# Patient Record
Sex: FEMALE | Race: WHITE | NOT HISPANIC OR LATINO | Employment: UNEMPLOYED | RURAL
[De-identification: names, ages, dates, MRNs, and addresses within clinical notes are randomized per-mention and may not be internally consistent; named-entity substitution may affect disease eponyms.]

---

## 2017-09-21 ENCOUNTER — HISTORICAL (OUTPATIENT)
Dept: ADMINISTRATIVE | Facility: HOSPITAL | Age: 32
End: 2017-09-21

## 2017-09-22 LAB
LAB AP CLINICAL INFORMATION: NORMAL
LAB AP DIAGNOSIS - HISTORICAL: NORMAL
LAB AP GROSS PATHOLOGY - HISTORICAL: NORMAL
LAB AP SPECIMEN SUBMITTED - HISTORICAL: NORMAL

## 2019-09-03 ENCOUNTER — OFFICE VISIT (OUTPATIENT)
Dept: OBSTETRICS AND GYNECOLOGY | Facility: CLINIC | Age: 34
End: 2019-09-03
Payer: COMMERCIAL

## 2019-09-03 VITALS — WEIGHT: 183.38 LBS | SYSTOLIC BLOOD PRESSURE: 140 MMHG | DIASTOLIC BLOOD PRESSURE: 90 MMHG

## 2019-09-03 DIAGNOSIS — N93.9 ABNORMAL UTERINE BLEEDING (AUB): ICD-10-CM

## 2019-09-03 DIAGNOSIS — Z01.419 ENCOUNTER FOR ANNUAL ROUTINE GYNECOLOGICAL EXAMINATION: Primary | ICD-10-CM

## 2019-09-03 DIAGNOSIS — Z12.4 PAP SMEAR FOR CERVICAL CANCER SCREENING: ICD-10-CM

## 2019-09-03 PROCEDURE — 99999 PR PBB SHADOW E&M-NEW PATIENT-LVL II: CPT | Mod: PBBFAC,,, | Performed by: OBSTETRICS & GYNECOLOGY

## 2019-09-03 PROCEDURE — 99203 OFFICE O/P NEW LOW 30 MIN: CPT | Mod: S$GLB,,, | Performed by: OBSTETRICS & GYNECOLOGY

## 2019-09-03 PROCEDURE — 99999 PR PBB SHADOW E&M-NEW PATIENT-LVL II: ICD-10-PCS | Mod: PBBFAC,,, | Performed by: OBSTETRICS & GYNECOLOGY

## 2019-09-03 PROCEDURE — 99203 PR OFFICE/OUTPT VISIT, NEW, LEVL III, 30-44 MIN: ICD-10-PCS | Mod: S$GLB,,, | Performed by: OBSTETRICS & GYNECOLOGY

## 2019-09-03 RX ORDER — LEVOTHYROXINE SODIUM 75 UG/1
TABLET ORAL
COMMUNITY
End: 2019-09-03 | Stop reason: SDUPTHER

## 2019-09-03 RX ORDER — HYDROCHLOROTHIAZIDE 12.5 MG/1
12.5 CAPSULE ORAL DAILY
Qty: 30 CAPSULE | Refills: 3 | Status: ON HOLD | OUTPATIENT
Start: 2019-09-03 | End: 2019-10-30 | Stop reason: HOSPADM

## 2019-09-03 RX ORDER — MEDROXYPROGESTERONE ACETATE 10 MG/1
10 TABLET ORAL 2 TIMES DAILY
Qty: 20 TABLET | Refills: 0 | Status: ON HOLD | OUTPATIENT
Start: 2019-09-03 | End: 2019-10-30 | Stop reason: HOSPADM

## 2019-09-03 RX ORDER — FLUOXETINE HYDROCHLORIDE 40 MG/1
40 CAPSULE ORAL DAILY
COMMUNITY
End: 2019-11-01

## 2019-09-03 RX ORDER — LEVOTHYROXINE SODIUM 75 UG/1
75 TABLET ORAL
Qty: 30 TABLET | Refills: 0 | Status: SHIPPED | OUTPATIENT
Start: 2019-09-03 | End: 2019-11-01

## 2019-09-03 NOTE — PROGRESS NOTES
Chief Complaint   Patient presents with    Metrorrhagia     started bleeding 8/19/19-present//lower abdominal pain       HISTORY OF PRESENT ILLNESS:   Janell Keating is a 34 y.o. female  who presents for AUB.  Patient's last menstrual period was 08/19/2019..  She normally monthly cycles lasting 10 days. But she started bleeding 8/19 and hasn't stopped. She is changing 7-8 tampons a day. She is passing quarter sized clots. She is starting to feel fatigued. She absolutely declines pelvic exam today. Hasn't had one since 14.       Past Medical History:   Diagnosis Date    Depression           Past Surgical History:   Procedure Laterality Date    GALLBLADDER SURGERY           Social History     Socioeconomic History    Marital status:      Spouse name: Not on file    Number of children: Not on file    Years of education: Not on file    Highest education level: Not on file   Occupational History    Not on file   Social Needs    Financial resource strain: Not on file    Food insecurity:     Worry: Not on file     Inability: Not on file    Transportation needs:     Medical: Not on file     Non-medical: Not on file   Tobacco Use    Smoking status: Heavy Tobacco Smoker   Substance and Sexual Activity    Alcohol use: Yes     Alcohol/week: 1.2 oz     Types: 2 Cans of beer per week    Drug use: Yes     Types: Marijuana    Sexual activity: Yes     Partners: Female   Lifestyle    Physical activity:     Days per week: Not on file     Minutes per session: Not on file    Stress: Not on file   Relationships    Social connections:     Talks on phone: Not on file     Gets together: Not on file     Attends Worship service: Not on file     Active member of club or organization: Not on file     Attends meetings of clubs or organizations: Not on file     Relationship status: Not on file   Other Topics Concern    Not on file   Social History Narrative    Not on file       History reviewed. No pertinent family  history.      OB History    Para Term  AB Living   0 0 0 0 0 0   SAB TAB Ectopic Multiple Live Births   0 0 0 0 0         COMPREHENSIVE GYN HISTORY:  PAP History: Denies abnormal Paps  Infection History: Denies STDs. Denies PID.  Benign History:Denies uterine fibroids. Denies ovarian cysts. Denies endometriosis Denies other conditions.  Cancer History: Denies cervical cancer. Denies uterine cancer or hyperplasia. Denies ovarian cancer. Denies vulvar cancer or pre-cancer. Denies vaginal cancer or pre-cancer. Denies breast cancer. Denies colon cancer.  Cycle: 12/mon/7-10 days    ROS:  GENERAL: Denies weight gain or weight loss. Feeling well overall.   SKIN: Denies rash or lesions.   HEAD: Denies headache.   NODES: Denies enlarged lymph nodes.   CHEST: Denies shortness of breath.   ABDOMEN: No abdominal pain, constipation, diarrhea, nausea, vomiting or rectal bleeding.   URINARY: No frequency, dysuria, hematuria, or burning on urination.  REPRODUCTIVE: See HPI.   BREASTS: The patient denies pain, lumps, or nipple discharge.       BP (!) 140/90   Wt 83.2 kg (183 lb 6.4 oz)   LMP 2019     APPEARANCE: Well nourished, well developed, in no acute distress.  NECK: Neck symmetric without  thyromegaly.  NODES: No inguinal, cervical lymph node enlargement.  CHEST: Lungs clear to auscultation.  HEART: Regular rate and rhythm, no murmurs, rubs or gallops.  ABDOMEN: Soft. No tenderness or masses. No hernias. No hepatosplenomegaly.    PELVIC: declined.      1. Encounter for annual routine gynecological examination    2. Pap smear for cervical cancer screening    3. Abnormal uterine bleeding (AUB)        Plan:  Discussed with her that we do exams all the time on people who are bleeding and it would help me figure out why she is bleeding but she declines until the bleeding stops. Will treat with provera 10mg BID x7 days and see back next week. We agreed if the bleeding hasn't stopped by then she will agree to an  exam. Will get TVUS and CBC and TSH in the meantime.   She is new to town and needs refill of her thyroid and BP medications. Sent in a 1 month supply and sent her upstairs to make PCP appt.       F/u in 1 week

## 2019-09-04 ENCOUNTER — HOSPITAL ENCOUNTER (OUTPATIENT)
Dept: RADIOLOGY | Facility: HOSPITAL | Age: 34
Discharge: HOME | End: 2019-09-04
Attending: OBSTETRICS & GYNECOLOGY
Payer: COMMERCIAL

## 2019-09-04 DIAGNOSIS — N93.9 ABNORMAL UTERINE BLEEDING (AUB): ICD-10-CM

## 2019-09-04 PROCEDURE — 76856 US EXAM PELVIC COMPLETE: CPT | Mod: TC,PO

## 2019-09-10 ENCOUNTER — OFFICE VISIT (OUTPATIENT)
Dept: OBSTETRICS AND GYNECOLOGY | Facility: CLINIC | Age: 34
End: 2019-09-10
Payer: COMMERCIAL

## 2019-09-10 VITALS — SYSTOLIC BLOOD PRESSURE: 150 MMHG | DIASTOLIC BLOOD PRESSURE: 96 MMHG | WEIGHT: 179.38 LBS

## 2019-09-10 DIAGNOSIS — Z12.4 PAP SMEAR FOR CERVICAL CANCER SCREENING: ICD-10-CM

## 2019-09-10 DIAGNOSIS — N93.9 ABNORMAL UTERINE BLEEDING (AUB): ICD-10-CM

## 2019-09-10 DIAGNOSIS — N83.201 CYST OF RIGHT OVARY: ICD-10-CM

## 2019-09-10 DIAGNOSIS — Z01.419 ENCOUNTER FOR ANNUAL ROUTINE GYNECOLOGICAL EXAMINATION: Primary | ICD-10-CM

## 2019-09-10 PROCEDURE — 99999 PR PBB SHADOW E&M-EST. PATIENT-LVL III: ICD-10-PCS | Mod: PBBFAC,,, | Performed by: OBSTETRICS & GYNECOLOGY

## 2019-09-10 PROCEDURE — 99395 PREV VISIT EST AGE 18-39: CPT | Mod: S$GLB,,, | Performed by: OBSTETRICS & GYNECOLOGY

## 2019-09-10 PROCEDURE — 99395 PR PREVENTIVE VISIT,EST,18-39: ICD-10-PCS | Mod: S$GLB,,, | Performed by: OBSTETRICS & GYNECOLOGY

## 2019-09-10 PROCEDURE — 88175 CYTOPATH C/V AUTO FLUID REDO: CPT

## 2019-09-10 PROCEDURE — 99999 PR PBB SHADOW E&M-EST. PATIENT-LVL III: CPT | Mod: PBBFAC,,, | Performed by: OBSTETRICS & GYNECOLOGY

## 2019-09-10 PROCEDURE — 87624 HPV HI-RISK TYP POOLED RSLT: CPT

## 2019-09-16 LAB
HPV HR 12 DNA CVX QL NAA+PROBE: NEGATIVE
HPV16 AG SPEC QL: NEGATIVE
HPV18 DNA SPEC QL NAA+PROBE: NEGATIVE

## 2019-10-29 ENCOUNTER — HOSPITAL ENCOUNTER (OUTPATIENT)
Facility: HOSPITAL | Age: 34
Discharge: HOME | End: 2019-10-30
Attending: EMERGENCY MEDICINE | Admitting: INTERNAL MEDICINE
Payer: COMMERCIAL

## 2019-10-29 DIAGNOSIS — E03.9 HYPOTHYROIDISM, UNSPECIFIED TYPE: ICD-10-CM

## 2019-10-29 DIAGNOSIS — F12.10 MILD TETRAHYDROCANNABINOL (THC) ABUSE: ICD-10-CM

## 2019-10-29 DIAGNOSIS — I10 ESSENTIAL HYPERTENSION: ICD-10-CM

## 2019-10-29 DIAGNOSIS — R10.10 UPPER ABDOMINAL PAIN: ICD-10-CM

## 2019-10-29 DIAGNOSIS — R11.10 EMESIS: ICD-10-CM

## 2019-10-29 DIAGNOSIS — F32.A DEPRESSION, UNSPECIFIED DEPRESSION TYPE: ICD-10-CM

## 2019-10-29 DIAGNOSIS — R11.2 INTRACTABLE VOMITING WITH NAUSEA: Primary | ICD-10-CM

## 2019-10-29 DIAGNOSIS — F10.10 ALCOHOL ABUSE: ICD-10-CM

## 2019-10-29 DIAGNOSIS — E03.9 ACQUIRED HYPOTHYROIDISM: ICD-10-CM

## 2019-10-29 LAB
ALBUMIN SERPL BCP-MCNC: 4.4 G/DL (ref 3.5–5.2)
ALP SERPL-CCNC: 104 U/L (ref 38–126)
ALT SERPL W/O P-5'-P-CCNC: 20 U/L (ref 10–44)
AMPHET+METHAMPHET UR QL: NEGATIVE
ANION GAP SERPL CALC-SCNC: 12 MMOL/L (ref 8–16)
AST SERPL-CCNC: 28 U/L (ref 15–46)
B-HCG UR QL: NEGATIVE
BARBITURATES UR QL SCN>200 NG/ML: NEGATIVE
BASOPHILS # BLD AUTO: 0.05 K/UL (ref 0–0.2)
BASOPHILS NFR BLD: 0.3 % (ref 0–1.9)
BENZODIAZ UR QL SCN>200 NG/ML: NEGATIVE
BILIRUB SERPL-MCNC: 0.5 MG/DL (ref 0.1–1)
BILIRUB UR QL STRIP: NEGATIVE
BUN SERPL-MCNC: 11 MG/DL (ref 7–17)
BZE UR QL SCN: NEGATIVE
CALCIUM SERPL-MCNC: 9.1 MG/DL (ref 8.7–10.5)
CANNABINOIDS UR QL SCN: NORMAL
CHLORIDE SERPL-SCNC: 109 MMOL/L (ref 95–110)
CLARITY UR REFRACT.AUTO: ABNORMAL
CO2 SERPL-SCNC: 21 MMOL/L (ref 23–29)
COLOR UR AUTO: YELLOW
CREAT SERPL-MCNC: 0.54 MG/DL (ref 0.5–1.4)
CREAT UR-MCNC: 58.2 MG/DL (ref 15–325)
DIFFERENTIAL METHOD: ABNORMAL
EOSINOPHIL # BLD AUTO: 0 K/UL (ref 0–0.5)
EOSINOPHIL NFR BLD: 0.1 % (ref 0–8)
ERYTHROCYTE [DISTWIDTH] IN BLOOD BY AUTOMATED COUNT: 13.7 % (ref 11.5–14.5)
EST. GFR  (AFRICAN AMERICAN): >60 ML/MIN/1.73 M^2
EST. GFR  (NON AFRICAN AMERICAN): >60 ML/MIN/1.73 M^2
GLUCOSE SERPL-MCNC: 125 MG/DL (ref 70–110)
GLUCOSE UR QL STRIP: NEGATIVE
HCT VFR BLD AUTO: 41.8 % (ref 37–48.5)
HGB BLD-MCNC: 14 G/DL (ref 12–16)
HGB UR QL STRIP: ABNORMAL
KETONES UR QL STRIP: ABNORMAL
LEUKOCYTE ESTERASE UR QL STRIP: NEGATIVE
LIPASE SERPL-CCNC: 56 U/L (ref 23–300)
LYMPHOCYTES # BLD AUTO: 2 K/UL (ref 1–4.8)
LYMPHOCYTES NFR BLD: 10.5 % (ref 18–48)
MCH RBC QN AUTO: 30.6 PG (ref 27–31)
MCHC RBC AUTO-ENTMCNC: 33.5 G/DL (ref 32–36)
MCV RBC AUTO: 92 FL (ref 82–98)
METHADONE UR QL SCN>300 NG/ML: NEGATIVE
MICROSCOPIC COMMENT: ABNORMAL
MONOCYTES # BLD AUTO: 1.2 K/UL (ref 0.3–1)
MONOCYTES NFR BLD: 6.3 % (ref 4–15)
NEUTROPHILS # BLD AUTO: 16 K/UL (ref 1.8–7.7)
NEUTROPHILS NFR BLD: 82.8 % (ref 38–73)
NITRITE UR QL STRIP: NEGATIVE
OPIATES UR QL SCN: NEGATIVE
PCP UR QL SCN>25 NG/ML: NEGATIVE
PH UR STRIP: 8 [PH] (ref 5–8)
PLATELET # BLD AUTO: 423 K/UL (ref 150–350)
PMV BLD AUTO: 10 FL (ref 9.2–12.9)
POTASSIUM SERPL-SCNC: 3.5 MMOL/L (ref 3.5–5.1)
PROT SERPL-MCNC: 7.2 G/DL (ref 6–8.4)
PROT UR QL STRIP: ABNORMAL
RBC # BLD AUTO: 4.57 M/UL (ref 4–5.4)
RBC #/AREA URNS AUTO: 25 /HPF (ref 0–4)
SODIUM SERPL-SCNC: 142 MMOL/L (ref 136–145)
SP GR UR STRIP: 1.01 (ref 1–1.03)
TOXICOLOGY INFORMATION: NORMAL
URN SPEC COLLECT METH UR: ABNORMAL
UROBILINOGEN UR STRIP-ACNC: NEGATIVE EU/DL
WBC # BLD AUTO: 19.41 K/UL (ref 3.9–12.7)

## 2019-10-29 PROCEDURE — G0378 HOSPITAL OBSERVATION PER HR: HCPCS

## 2019-10-29 PROCEDURE — 93010 ELECTROCARDIOGRAM REPORT: CPT | Mod: ,,, | Performed by: INTERNAL MEDICINE

## 2019-10-29 PROCEDURE — 96361 HYDRATE IV INFUSION ADD-ON: CPT

## 2019-10-29 PROCEDURE — 85025 COMPLETE CBC W/AUTO DIFF WBC: CPT | Mod: ER

## 2019-10-29 PROCEDURE — 80053 COMPREHEN METABOLIC PANEL: CPT | Mod: ER

## 2019-10-29 PROCEDURE — 63600175 PHARM REV CODE 636 W HCPCS: Performed by: INTERNAL MEDICINE

## 2019-10-29 PROCEDURE — 96361 HYDRATE IV INFUSION ADD-ON: CPT | Mod: ER

## 2019-10-29 PROCEDURE — 25000003 PHARM REV CODE 250: Mod: ER | Performed by: EMERGENCY MEDICINE

## 2019-10-29 PROCEDURE — 80307 DRUG TEST PRSMV CHEM ANLYZR: CPT | Mod: ER

## 2019-10-29 PROCEDURE — 83690 ASSAY OF LIPASE: CPT | Mod: ER

## 2019-10-29 PROCEDURE — 96376 TX/PRO/DX INJ SAME DRUG ADON: CPT

## 2019-10-29 PROCEDURE — 81025 URINE PREGNANCY TEST: CPT | Mod: ER

## 2019-10-29 PROCEDURE — 63600175 PHARM REV CODE 636 W HCPCS: Mod: ER | Performed by: EMERGENCY MEDICINE

## 2019-10-29 PROCEDURE — 99285 EMERGENCY DEPT VISIT HI MDM: CPT | Mod: 25,ER

## 2019-10-29 PROCEDURE — 81000 URINALYSIS NONAUTO W/SCOPE: CPT | Mod: 59,ER

## 2019-10-29 PROCEDURE — 96365 THER/PROPH/DIAG IV INF INIT: CPT | Mod: ER

## 2019-10-29 PROCEDURE — 93010 EKG 12-LEAD: ICD-10-PCS | Mod: ,,, | Performed by: INTERNAL MEDICINE

## 2019-10-29 PROCEDURE — 93005 ELECTROCARDIOGRAM TRACING: CPT | Mod: ER

## 2019-10-29 PROCEDURE — 63600175 PHARM REV CODE 636 W HCPCS: Performed by: STUDENT IN AN ORGANIZED HEALTH CARE EDUCATION/TRAINING PROGRAM

## 2019-10-29 PROCEDURE — S0028 INJECTION, FAMOTIDINE, 20 MG: HCPCS | Mod: ER | Performed by: EMERGENCY MEDICINE

## 2019-10-29 PROCEDURE — 96375 TX/PRO/DX INJ NEW DRUG ADDON: CPT | Mod: ER

## 2019-10-29 PROCEDURE — 25500020 PHARM REV CODE 255: Mod: ER | Performed by: EMERGENCY MEDICINE

## 2019-10-29 PROCEDURE — 25000003 PHARM REV CODE 250: Performed by: STUDENT IN AN ORGANIZED HEALTH CARE EDUCATION/TRAINING PROGRAM

## 2019-10-29 RX ORDER — HYDROCHLOROTHIAZIDE 12.5 MG/1
12.5 TABLET ORAL DAILY
Status: DISCONTINUED | OUTPATIENT
Start: 2019-10-30 | End: 2019-10-30 | Stop reason: HOSPADM

## 2019-10-29 RX ORDER — HYOSCYAMINE SULFATE 0.38 MG/1
0.38 TABLET, EXTENDED RELEASE ORAL
Status: DISCONTINUED | OUTPATIENT
Start: 2019-10-29 | End: 2019-10-29

## 2019-10-29 RX ORDER — FLUOXETINE HYDROCHLORIDE 20 MG/1
40 CAPSULE ORAL DAILY
Status: DISCONTINUED | OUTPATIENT
Start: 2019-10-30 | End: 2019-10-30 | Stop reason: HOSPADM

## 2019-10-29 RX ORDER — ONDANSETRON 2 MG/ML
8 INJECTION INTRAMUSCULAR; INTRAVENOUS EVERY 6 HOURS
Status: DISCONTINUED | OUTPATIENT
Start: 2019-10-30 | End: 2019-10-30 | Stop reason: HOSPADM

## 2019-10-29 RX ORDER — ONDANSETRON 2 MG/ML
4 INJECTION INTRAMUSCULAR; INTRAVENOUS
Status: COMPLETED | OUTPATIENT
Start: 2019-10-29 | End: 2019-10-29

## 2019-10-29 RX ORDER — HALOPERIDOL 5 MG/ML
5 INJECTION INTRAMUSCULAR
Status: COMPLETED | OUTPATIENT
Start: 2019-10-29 | End: 2019-10-29

## 2019-10-29 RX ORDER — KETOROLAC TROMETHAMINE 30 MG/ML
30 INJECTION, SOLUTION INTRAMUSCULAR; INTRAVENOUS
Status: COMPLETED | OUTPATIENT
Start: 2019-10-29 | End: 2019-10-29

## 2019-10-29 RX ORDER — ONDANSETRON HCL IN 0.9 % NACL 8 MG/50 ML
8 INTRAVENOUS SOLUTION, PIGGYBACK (ML) INTRAVENOUS EVERY 6 HOURS PRN
Status: DISCONTINUED | OUTPATIENT
Start: 2019-10-29 | End: 2019-10-29

## 2019-10-29 RX ORDER — FAMOTIDINE 10 MG/ML
20 INJECTION INTRAVENOUS
Status: COMPLETED | OUTPATIENT
Start: 2019-10-29 | End: 2019-10-29

## 2019-10-29 RX ORDER — LEVOTHYROXINE SODIUM 25 UG/1
75 TABLET ORAL
Status: DISCONTINUED | OUTPATIENT
Start: 2019-10-30 | End: 2019-10-30 | Stop reason: HOSPADM

## 2019-10-29 RX ORDER — METOCLOPRAMIDE HYDROCHLORIDE 5 MG/ML
10 INJECTION INTRAMUSCULAR; INTRAVENOUS
Status: COMPLETED | OUTPATIENT
Start: 2019-10-29 | End: 2019-10-29

## 2019-10-29 RX ORDER — ONDANSETRON 2 MG/ML
8 INJECTION INTRAMUSCULAR; INTRAVENOUS EVERY 6 HOURS PRN
Status: DISCONTINUED | OUTPATIENT
Start: 2019-10-29 | End: 2019-10-29

## 2019-10-29 RX ADMIN — ONDANSETRON 8 MG: 2 INJECTION INTRAMUSCULAR; INTRAVENOUS at 08:10

## 2019-10-29 RX ADMIN — PROMETHAZINE HYDROCHLORIDE 25 MG: 25 INJECTION INTRAMUSCULAR; INTRAVENOUS at 03:10

## 2019-10-29 RX ADMIN — PROMETHAZINE HYDROCHLORIDE 25 MG: 25 INJECTION INTRAMUSCULAR; INTRAVENOUS at 10:10

## 2019-10-29 RX ADMIN — ONDANSETRON 4 MG: 2 INJECTION INTRAMUSCULAR; INTRAVENOUS at 08:10

## 2019-10-29 RX ADMIN — HALOPERIDOL LACTATE 5 MG: 5 INJECTION, SOLUTION INTRAMUSCULAR at 12:10

## 2019-10-29 RX ADMIN — FOLIC ACID: 5 INJECTION, SOLUTION INTRAMUSCULAR; INTRAVENOUS; SUBCUTANEOUS at 08:10

## 2019-10-29 RX ADMIN — IOHEXOL 90 ML: 350 INJECTION, SOLUTION INTRAVENOUS at 01:10

## 2019-10-29 RX ADMIN — SODIUM CHLORIDE 1000 ML: 0.9 INJECTION, SOLUTION INTRAVENOUS at 03:10

## 2019-10-29 RX ADMIN — SODIUM CHLORIDE, SODIUM LACTATE, POTASSIUM CHLORIDE, AND CALCIUM CHLORIDE 1000 ML: .6; .31; .03; .02 INJECTION, SOLUTION INTRAVENOUS at 07:10

## 2019-10-29 RX ADMIN — FAMOTIDINE 20 MG: 10 INJECTION, SOLUTION INTRAVENOUS at 08:10

## 2019-10-29 RX ADMIN — LORAZEPAM 1 MG: 2 INJECTION INTRAMUSCULAR; INTRAVENOUS at 10:10

## 2019-10-29 RX ADMIN — SODIUM CHLORIDE, SODIUM LACTATE, POTASSIUM CHLORIDE, AND CALCIUM CHLORIDE 1000 ML: .6; .31; .03; .02 INJECTION, SOLUTION INTRAVENOUS at 08:10

## 2019-10-29 RX ADMIN — METOCLOPRAMIDE 10 MG: 5 INJECTION, SOLUTION INTRAMUSCULAR; INTRAVENOUS at 09:10

## 2019-10-29 RX ADMIN — KETOROLAC TROMETHAMINE 30 MG: 30 INJECTION, SOLUTION INTRAMUSCULAR at 08:10

## 2019-10-29 RX ADMIN — PROMETHAZINE HYDROCHLORIDE 25 MG: 25 INJECTION INTRAMUSCULAR; INTRAVENOUS at 07:10

## 2019-10-29 NOTE — ED PROVIDER NOTES
Encounter Date: 10/29/2019       History     Chief Complaint   Patient presents with    Emesis     Pt c/o N/V/D with intermittent ABD cramping this AM.  Pt is actively vomiting during triage.     HPI   This is a 34 y.o. female who has a past medical history of Depression and Hypothyroidism.     The patient presents to the Emergency Department with nausea vomiting and diarrhea since this morning.  Patient states that she drink too much alcohol yesterday.   Symptoms are associated with upper abdominal cramping.  Pt denies fever or chills, URI symptoms, blood in urine, stool or vomit.  Symptoms are aggravated by p.o. intake.  Symptoms are relieved by nothing.   Patient has prior history of similar symptoms.       Review of patient's allergies indicates:   Allergen Reactions    Sulfa (sulfonamide antibiotics) Hives     Past Medical History:   Diagnosis Date    Depression     Hypothyroidism      Past Surgical History:   Procedure Laterality Date    GALLBLADDER SURGERY       History reviewed. No pertinent family history.  Social History     Tobacco Use    Smoking status: Heavy Tobacco Smoker   Substance Use Topics    Alcohol use: Yes     Alcohol/week: 2.0 standard drinks     Types: 2 Cans of beer per week    Drug use: Yes     Types: Marijuana     Review of Systems   Constitutional: Positive for activity change, appetite change and fatigue. Negative for fever.   HENT: Negative for sore throat.    Respiratory: Negative for shortness of breath.    Cardiovascular: Negative for chest pain.   Gastrointestinal: Positive for abdominal pain, diarrhea, nausea and vomiting. Negative for blood in stool.   Genitourinary: Negative for dysuria.   Musculoskeletal: Negative for back pain.   Skin: Negative for rash.   Neurological: Positive for light-headedness. Negative for weakness and headaches.   Hematological: Does not bruise/bleed easily.   Psychiatric/Behavioral: Positive for sleep disturbance.   All other systems reviewed  and are negative.      Physical Exam     Initial Vitals [10/29/19 0650]   BP Pulse Resp Temp SpO2   (!) 179/79 79 (!) 22 96.6 °F (35.9 °C) 97 %      MAP       --         Physical Exam    Nursing note and vitals reviewed.  Constitutional: She appears well-developed and well-nourished. She is not diaphoretic. She appears distressed.   Curled up in stretcher, fetal position, looks uncomfortable   HENT:   Head: Normocephalic and atraumatic.   Dry oropharynx, foul breath   Eyes: Conjunctivae are normal. Pupils are equal, round, and reactive to light.   Neck: Neck supple.   Cardiovascular: Normal rate, regular rhythm, normal heart sounds and intact distal pulses.   Pulmonary/Chest: Breath sounds normal. No respiratory distress. She has no wheezes. She has no rhonchi. She has no rales.   Abdominal: Soft. She exhibits no distension. There is tenderness (Epigastric). There is no guarding.   Mildly hyperactive bowel sounds   Musculoskeletal: Normal range of motion. She exhibits no edema or tenderness.   Neurological: She is oriented to person, place, and time. She has normal strength.   Awake, appears fatigued   Skin: Skin is warm and dry. Capillary refill takes less than 2 seconds. No rash noted.   Psychiatric: She has a normal mood and affect. Thought content normal.         ED Course   Procedures  Labs Reviewed   CBC W/ AUTO DIFFERENTIAL - Abnormal; Notable for the following components:       Result Value    WBC 19.41 (*)     Platelets 423 (*)     Gran # (ANC) 16.0 (*)     Mono # 1.2 (*)     Gran% 82.8 (*)     Lymph% 10.5 (*)     All other components within normal limits   COMPREHENSIVE METABOLIC PANEL - Abnormal; Notable for the following components:    CO2 21 (*)     Glucose 125 (*)     All other components within normal limits   LIPASE   DRUG SCREEN PANEL, URINE EMERGENCY   PREGNANCY TEST, URINE RAPID   URINALYSIS, REFLEX TO URINE CULTURE        ECG Results          EKG 12-lead (Final result)  Result time 10/29/19  14:50:21    Final result by Myke, Lab [facility] (10/29/19 14:50:21)                 Narrative:    Test Reason : R11.10,    Vent. Rate : 061 BPM     Atrial Rate : 061 BPM     P-R Int : 130 ms          QRS Dur : 090 ms      QT Int : 428 ms       P-R-T Axes : 009 007 017 degrees     QTc Int : 430 ms    Normal sinus rhythm with sinus arrhythmia  Possible Inferior infarct ,age undetermined  Abnormal ECG  No previous ECGs available  Confirmed by Thierry HUANG MD, Con GANT ([id number]) on 10/29/2019 2:50:07 PM    Referred By: [email or web address]   SELF           Confirmed By:Con Guadarrama III, MD                            Imaging Results          CT Abdomen Pelvis With Contrast (Final result)  Result time 10/29/19 13:54:27    Final result by SINDY Gil Sr., MD (10/29/19 13:54:27)                 Impression:      1. The appendix and the rest of the gastrointestinal system are normal in appearance.  2. The uterus and ovaries were not optimally visualized. There are findings characteristic of a 16 mm follicle in the left ovary.  If additional imaging evaluation is clinically indicated, I recommend consideration of an ultrasound examination of the pelvis.  3. There are 4 lumbar-type vertebral bodies. There is a transitional vertebral body between L4 and the sacrum.  4. There are mild dependent atelectatic changes in both lungs.  5. The gallbladder is absent. There are surgical clips in the gallbladder fossa.  All CT scans at this facility use dose modulation, iterative reconstruction, and/or weight base dosing when appropriate to reduce radiation dose when appropriate to reduce radiation dose to as low as reasonably achievable.      Electronically signed by: Isma Gil MD  Date:    10/29/2019  Time:    13:54             Narrative:    EXAMINATION:  CT ABDOMEN PELVIS WITH CONTRAST    CLINICAL HISTORY:  Bowel obstruction, high-grade;    TECHNIQUE:  Standard abdomen and pelvis CT protocol with IV contrast was performed.  90 mL  of Omnipaque 350 contrast material was used for this examination.  There was no oral contrast administered.    COMPARISON:  A KUB performed on 10/29/2019.    FINDINGS:  Finding: The size of the heart is within normal limits.  There are mild dependent atelectatic changes in both lungs.  There is no pneumothorax or pleural effusion.    The gallbladder is absent.  There are surgical clips in the gallbladder fossa.  The liver, pancreas, spleen, adrenals, and kidneys are normal in appearance. The ureters and the urinary bladder are normal in appearance.  The uterus and ovaries were not optimally visualized.  There are findings characteristic of a 16 mm follicle in the left ovary.  The appendix and the rest of the gastrointestinal system are normal in appearance. There is no free fluid within the abdomen or pelvis. There is no pneumoperitoneum.  There are 4 lumbar-type vertebral bodies.  There is a transitional vertebral body between L4 and the sacrum.                               X-Ray Abdomen AP 1 View (KUB) (Final result)  Result time 10/29/19 08:40:58    Final result by SINDY Gil Sr., MD (10/29/19 08:40:58)                 Impression:      1. There are surgical clips projected over the right upper quadrant of the abdomen.  2. There are 4 lumbar-type vertebral bodies. There is a transitional vertebral body between L4 and the sacrum.      Electronically signed by: Isma Gil MD  Date:    10/29/2019  Time:    08:40             Narrative:    EXAMINATION:  XR ABDOMEN AP 1 VIEW    CLINICAL HISTORY:  Upper abdominal pain, unspecified    COMPARISON:  None    FINDINGS:  The bowel gas pattern is normal in appearance. There is no pneumoperitoneum.  There are 4 lumbar-type vertebral bodies.  There is a transitional vertebral body between L4 and the sacrum.  There are surgical clips projected over the right upper quadrant of the abdomen.                              X-Rays:   Independently Interpreted Readings:    Other Readings:  X-ray abdomen:  Paucity of bowel gas, nonobstructive bowel gas pattern.    Medical Decision Making:   Initial Assessment:   This is an emergent evaluation of a 34 y.o. female patient with presentation of nausea, vomiting, diarrhea.  Patient states she believes it is from drinking too much yesterday.     Dx:  Alcoholic gastritis, appendicitis, viral gastroenteritis, food poisoning, doubt colitis or appendicitis    Plan: IV fluids, antiemetic, pain control, basic labs, lipase    Clinical Tests:   Lab Tests: Ordered and Reviewed  Radiological Study: Ordered and Reviewed                   ED Course as of Oct 29 1457   Tue Oct 29, 2019   0733 Lipase Result: 56 [NP]   0733 Sodium: 142 [NP]   0733 Potassium: 3.5 [NP]   0733 Chloride: 109 [NP]   0733 CO2(!): 21 [NP]   0733 Glucose(!): 125 [NP]   0733 BUN, Bld: 11 [NP]   0733 Creatinine: 0.54 [NP]   0733 WBC(!): 19.41 [NP]   0733 Hemoglobin: 14.0 [NP]   0733 Platelets(!): 423 [NP]   0807 Patient re-evaluated, stillAppears uncomfortable.  Now complaining of pain in the epigastric region.    [NP]   1231 EKG:  Sinus rhythm with sinus arrhythmia at 60 bpm, nl axis, nl intervals, no hypertrophy, no ST-T changes as read by me (Dr. Perez).  Impression:  Otherwise normal EKG.  No QTC prolongation    [NP]   1231 Proceed with Haldol IV    [NP]   1456 Patient has not significantly improved and is still vomiting after Haldol, amongst all the other therapies today.  She has intractable vomiting, possibly gastritis from alcohol.  CT scan does not show any significant findings at this time.       Case discussed with Dr. Almanza, [facility], who accepted patient to CDU.  Will add on urine studies.  Patient wishes to go by POV.  Will remove IV prior to transport to [place].    [NP]      ED Course User Index  [NP] Manish Perez MD     Clinical Impression:       ICD-10-CM ICD-9-CM   1. Intractable vomiting with nausea R11.2 536.2   2. Upper abdominal pain R10.10  789.09   3. Emesis R11.10 787.03                                Manish Perez MD  10/29/19 1457

## 2019-10-29 NOTE — ED NOTES
Patient actively vomiting. Patient requesting something to drink. Patient informed that she should wait until after she receive nausea medication since she is presently vomiting.

## 2019-10-29 NOTE — ED NOTES
Call for CDU bed from [facility] ED charge nurse Dariela, awaiting return call.  Informed that Dr Perez states pt will be transporting POV.

## 2019-10-29 NOTE — ED NOTES
Patient lying in bed, no signs of distress is noted. Patient informed that a CT scan was ordered for her. Patient family member is at the bedside. Will continue to monitor the patient.

## 2019-10-29 NOTE — ED NOTES
Patient came in with a c/o nausea and vomiting which started early this am. Patient states that she had a lot to drink last night. Patient denies any abdominal pain.

## 2019-10-29 NOTE — ED NOTES
Patient refused to be placed in hospital gown. Patient informed that she had vomit on her shirt. Patient states that she want to stay in her clothes.

## 2019-10-29 NOTE — ED NOTES
Patient informed that she will be admitted to the hospital. Patient agrees; however patient want to drive to the receiving hospital. MD states that's ok.

## 2019-10-29 NOTE — ED NOTES
EKG completed, reviewed by Dr. Perez, QT interval is 61. Per pharmacy ( [facility]) okay to give Haldol IV with monitoring of QT interval for prolongation. Pt was placed on cardiac monitoring and given medications.    Pt vomited prior to medication administration, which was green and foul in odor.

## 2019-10-29 NOTE — ED NOTES
Patient changed to position of comfort. Patient provided warm blankets. Will continue to monitor the patient.

## 2019-10-30 ENCOUNTER — CLINICAL SUPPORT (OUTPATIENT)
Dept: SMOKING CESSATION | Facility: CLINIC | Age: 34
End: 2019-10-30

## 2019-10-30 VITALS
HEIGHT: 65 IN | DIASTOLIC BLOOD PRESSURE: 72 MMHG | BODY MASS INDEX: 30.05 KG/M2 | RESPIRATION RATE: 18 BRPM | WEIGHT: 180.38 LBS | HEART RATE: 56 BPM | TEMPERATURE: 99 F | SYSTOLIC BLOOD PRESSURE: 161 MMHG | OXYGEN SATURATION: 99 %

## 2019-10-30 DIAGNOSIS — F17.210 CIGARETTE SMOKER: Primary | ICD-10-CM

## 2019-10-30 LAB
ALBUMIN SERPL BCP-MCNC: 3.2 G/DL (ref 3.5–5.2)
ALP SERPL-CCNC: 66 U/L (ref 55–135)
ALT SERPL W/O P-5'-P-CCNC: 12 U/L (ref 10–44)
ANION GAP SERPL CALC-SCNC: 10 MMOL/L (ref 8–16)
AST SERPL-CCNC: 19 U/L (ref 10–40)
BASOPHILS # BLD AUTO: 0.02 K/UL (ref 0–0.2)
BASOPHILS NFR BLD: 0.2 % (ref 0–1.9)
BILIRUB SERPL-MCNC: 0.4 MG/DL (ref 0.1–1)
BUN SERPL-MCNC: 7 MG/DL (ref 6–20)
CALCIUM SERPL-MCNC: 8.3 MG/DL (ref 8.7–10.5)
CHLORIDE SERPL-SCNC: 105 MMOL/L (ref 95–110)
CO2 SERPL-SCNC: 25 MMOL/L (ref 23–29)
CREAT SERPL-MCNC: 0.7 MG/DL (ref 0.5–1.4)
DIFFERENTIAL METHOD: ABNORMAL
EOSINOPHIL # BLD AUTO: 0 K/UL (ref 0–0.5)
EOSINOPHIL NFR BLD: 0.2 % (ref 0–8)
ERYTHROCYTE [DISTWIDTH] IN BLOOD BY AUTOMATED COUNT: 13.6 % (ref 11.5–14.5)
EST. GFR  (AFRICAN AMERICAN): >60 ML/MIN/1.73 M^2
EST. GFR  (NON AFRICAN AMERICAN): >60 ML/MIN/1.73 M^2
FERRITIN SERPL-MCNC: 23 NG/ML (ref 20–300)
FOLATE SERPL-MCNC: 32.9 NG/ML (ref 4–24)
GLUCOSE SERPL-MCNC: 130 MG/DL (ref 70–110)
HCT VFR BLD AUTO: 35 % (ref 37–48.5)
HGB BLD-MCNC: 11.3 G/DL (ref 12–16)
IRON SERPL-MCNC: 82 UG/DL (ref 30–160)
LYMPHOCYTES # BLD AUTO: 2.2 K/UL (ref 1–4.8)
LYMPHOCYTES NFR BLD: 17.9 % (ref 18–48)
MCH RBC QN AUTO: 29.8 PG (ref 27–31)
MCHC RBC AUTO-ENTMCNC: 32.3 G/DL (ref 32–36)
MCV RBC AUTO: 92 FL (ref 82–98)
MONOCYTES # BLD AUTO: 0.8 K/UL (ref 0.3–1)
MONOCYTES NFR BLD: 6.6 % (ref 4–15)
NEUTROPHILS # BLD AUTO: 9.4 K/UL (ref 1.8–7.7)
NEUTROPHILS NFR BLD: 75.1 % (ref 38–73)
PLATELET # BLD AUTO: 326 K/UL (ref 150–350)
PMV BLD AUTO: 10.2 FL (ref 9.2–12.9)
POTASSIUM SERPL-SCNC: 3.2 MMOL/L (ref 3.5–5.1)
PROT SERPL-MCNC: 5.7 G/DL (ref 6–8.4)
RBC # BLD AUTO: 3.79 M/UL (ref 4–5.4)
SATURATED IRON: 22 % (ref 20–50)
SODIUM SERPL-SCNC: 140 MMOL/L (ref 136–145)
TOTAL IRON BINDING CAPACITY: 373 UG/DL (ref 250–450)
TRANSFERRIN SERPL-MCNC: 252 MG/DL (ref 200–375)
TSH SERPL DL<=0.005 MIU/L-ACNC: 2.47 UIU/ML (ref 0.4–4)
VIT B12 SERPL-MCNC: 451 PG/ML (ref 210–950)
WBC # BLD AUTO: 12.54 K/UL (ref 3.9–12.7)

## 2019-10-30 PROCEDURE — 82746 ASSAY OF FOLIC ACID SERUM: CPT

## 2019-10-30 PROCEDURE — 80053 COMPREHEN METABOLIC PANEL: CPT

## 2019-10-30 PROCEDURE — 25000003 PHARM REV CODE 250: Performed by: STUDENT IN AN ORGANIZED HEALTH CARE EDUCATION/TRAINING PROGRAM

## 2019-10-30 PROCEDURE — 63600175 PHARM REV CODE 636 W HCPCS: Performed by: STUDENT IN AN ORGANIZED HEALTH CARE EDUCATION/TRAINING PROGRAM

## 2019-10-30 PROCEDURE — 83540 ASSAY OF IRON: CPT

## 2019-10-30 PROCEDURE — 82607 VITAMIN B-12: CPT

## 2019-10-30 PROCEDURE — 99999 PR PBB SHADOW E&M-EST. PATIENT-LVL I: CPT | Mod: PBBFAC,,,

## 2019-10-30 PROCEDURE — 96376 TX/PRO/DX INJ SAME DRUG ADON: CPT

## 2019-10-30 PROCEDURE — 99406 BEHAV CHNG SMOKING 3-10 MIN: CPT | Mod: S$GLB,,,

## 2019-10-30 PROCEDURE — 85025 COMPLETE CBC W/AUTO DIFF WBC: CPT

## 2019-10-30 PROCEDURE — 84443 ASSAY THYROID STIM HORMONE: CPT

## 2019-10-30 PROCEDURE — G0378 HOSPITAL OBSERVATION PER HR: HCPCS

## 2019-10-30 PROCEDURE — 36415 COLL VENOUS BLD VENIPUNCTURE: CPT

## 2019-10-30 PROCEDURE — 99999 PR PBB SHADOW E&M-EST. PATIENT-LVL I: ICD-10-PCS | Mod: PBBFAC,,,

## 2019-10-30 PROCEDURE — 82728 ASSAY OF FERRITIN: CPT

## 2019-10-30 PROCEDURE — 99406 PT REFUSED TOBACCO CESSATION: ICD-10-PCS | Mod: S$GLB,,,

## 2019-10-30 PROCEDURE — 96366 THER/PROPH/DIAG IV INF ADDON: CPT

## 2019-10-30 PROCEDURE — 96361 HYDRATE IV INFUSION ADD-ON: CPT

## 2019-10-30 RX ORDER — ONDANSETRON 4 MG/1
4 TABLET, FILM COATED ORAL EVERY 6 HOURS PRN
Qty: 12 TABLET | Refills: 0 | OUTPATIENT
Start: 2019-10-30 | End: 2019-10-31

## 2019-10-30 RX ORDER — POTASSIUM CHLORIDE 20 MEQ/1
40 TABLET, EXTENDED RELEASE ORAL ONCE
Status: DISCONTINUED | OUTPATIENT
Start: 2019-10-30 | End: 2019-10-30 | Stop reason: HOSPADM

## 2019-10-30 RX ORDER — KETOROLAC TROMETHAMINE 30 MG/ML
15 INJECTION, SOLUTION INTRAMUSCULAR; INTRAVENOUS EVERY 6 HOURS
Status: DISCONTINUED | OUTPATIENT
Start: 2019-10-30 | End: 2019-10-30 | Stop reason: HOSPADM

## 2019-10-30 RX ORDER — DICYCLOMINE HYDROCHLORIDE 10 MG/1
10 CAPSULE ORAL
Qty: 12 CAPSULE | Refills: 0 | Status: SHIPPED | OUTPATIENT
Start: 2019-10-30 | End: 2019-11-02

## 2019-10-30 RX ADMIN — KETOROLAC TROMETHAMINE 15 MG: 30 INJECTION, SOLUTION INTRAMUSCULAR at 07:10

## 2019-10-30 RX ADMIN — PROMETHAZINE HYDROCHLORIDE 25 MG: 25 INJECTION INTRAMUSCULAR; INTRAVENOUS at 07:10

## 2019-10-30 RX ADMIN — ONDANSETRON 8 MG: 2 INJECTION INTRAMUSCULAR; INTRAVENOUS at 06:10

## 2019-10-30 RX ADMIN — FOLIC ACID: 5 INJECTION, SOLUTION INTRAMUSCULAR; INTRAVENOUS; SUBCUTANEOUS at 12:10

## 2019-10-30 RX ADMIN — KETOROLAC TROMETHAMINE 15 MG: 30 INJECTION, SOLUTION INTRAMUSCULAR at 12:10

## 2019-10-30 RX ADMIN — FLUOXETINE 40 MG: 20 CAPSULE ORAL at 09:10

## 2019-10-30 RX ADMIN — PROMETHAZINE HYDROCHLORIDE 12.5 MG: 25 INJECTION INTRAMUSCULAR; INTRAVENOUS at 04:10

## 2019-10-30 RX ADMIN — FOLIC ACID: 5 INJECTION, SOLUTION INTRAMUSCULAR; INTRAVENOUS; SUBCUTANEOUS at 04:10

## 2019-10-30 RX ADMIN — HYDROCHLOROTHIAZIDE 12.5 MG: 12.5 TABLET ORAL at 09:10

## 2019-10-30 RX ADMIN — ONDANSETRON 8 MG: 2 INJECTION INTRAMUSCULAR; INTRAVENOUS at 12:10

## 2019-10-30 NOTE — PLAN OF CARE
Pt AAO x 4.  VSS.  Pt remained afebrile throughout this shift.   Pt remained free of falls this shift.   Pt c/o pain this shift.  PRN analgesics administered as ordered.   Plan of care reviewed. Patient verbalizes understanding.   Pt moving/turing independently. Frequent weight shifting encouraged.  Bed low, side rails up x 2, wheels locked, call light in reach.   Pt family member remained at bedside most of shift.   Bed alarm maintained for safety.   Patient instructed to call for assistance.   Hourly rounding completed.   Will continue to monitor.

## 2019-10-30 NOTE — PLAN OF CARE
Pt reports she lives with her spouse and has no d/c needs. Pt's spouse can provide help at home and transportation upon d/c.Pt reports she has not established a PCP since moving to [place] 2 months ago but will call her insurance to see who is in network in the area and establish PCP. Tn informed pt of community clinics in the area as well and put information on AVS.  Tn gave pt d/c brochure and card and encouraged to call for any needs/concerns.     10/30/19 1043   Discharge Assessment   Assessment Type Discharge Planning Assessment   Confirmed/corrected address and phone number on facesheet? Yes   Assessment information obtained from? Patient   Expected Length of Stay (days) 1   Communicated expected length of stay with patient/caregiver yes   Prior to hospitilization cognitive status: Alert/Oriented   Prior to hospitalization functional status: Independent   Current cognitive status: Alert/Oriented   Current Functional Status: Needs Assistance   Lives With spouse   Able to Return to Prior Arrangements yes   Is patient able to care for self after discharge? Yes   Who are your caregiver(s) and their phone number(s)? Carmelina (spouse) [phone]   Patient's perception of discharge disposition home or selfcare   Readmission Within the Last 30 Days no previous admission in last 30 days   Patient currently being followed by outpatient case management? No   Patient currently receives any other outside agency services? No   Equipment Currently Used at Home none   Do you have any problems affording any of your prescribed medications? No   Is the patient taking medications as prescribed? yes   Does the patient have transportation home? Yes   Transportation Anticipated family or friend will provide   Does the patient receive services at the [facility]mad[facility] Clinic? No   Discharge Plan A Home   Discharge Plan B Home with family   DME Needed Upon Discharge  none   Patient/Family in Agreement with Plan yes

## 2019-10-30 NOTE — PLAN OF CARE
10/30/19 1307   Final Note   Assessment Type Final Discharge Note   Anticipated Discharge Disposition Home   What phone number can be called within the next 1-3 days to see how you are doing after discharge? [phone]   Hospital Follow Up  Appt(s) scheduled? No  (pt prefers)   Discharge plans and expectations educations in teach back method with documentation complete? Yes   Right Care Referral Info   Post Acute Recommendation No Care

## 2019-10-30 NOTE — NURSING
Pt AAO  VSS NAD.   IV removed.   Educated pt and significant other on medications, when to take, how to take, side effects  Pt and significant other verbalized understanding  Waiting for transport

## 2019-10-30 NOTE — H&P
[facility] Medicine H&P Note     Admitting Team: [facility] Hospitalist Team A  Attending Physician: Albina Almanza MD  Resident: Tanya  Intern: Jethro     Date of Admit: 10/29/2019    Chief Complaint     Nausea and vomiting for 18 hours    Subjective:      History of Present Illness:  Janell Keating is a 34 y.o. female with past medical history of hypothyroidism, hypertension, and depression who [place] [facility] [place] and vomiting (non-bloody) for 18 hours. Patient is actively vomiting during interview. Patient states that she had a lot to drink yesterday although is unable to quantify the amount of alcohol she had. She also smoked marijuana last night as well. Starting at 2:30 am she started have severe nausea and vomiting along with diffuse abdominal cramping. She denies fevers, chills, diarrhea, constipation, dysuria. States decreased appetite since nausea and vomiting began.     Past Medical History:  Past Medical History:   Diagnosis Date    Depression     Hypothyroidism        Past Surgical History:  Past Surgical History:   Procedure Laterality Date    GALLBLADDER SURGERY         Allergies:  Review of patient's allergies indicates:   Allergen Reactions    Sulfa (sulfonamide antibiotics) Hives       Home Medications:  Prior to Admission medications    Medication Sig Start Date End Date Taking? Authorizing Provider   FLUoxetine 40 MG capsule Take 40 mg by mouth once daily.    Historical Provider, MD   hydroCHLOROthiazide (MICROZIDE) 12.5 mg capsule Take 1 capsule (12.5 mg total) by mouth once daily. 9/3/19 9/2/20  Mercy Maki MD   levothyroxine (SYNTHROID) 75 MCG tablet Take 1 tablet (75 mcg total) by mouth before breakfast. 9/3/19   Mercy Maki MD   medroxyPROGESTERone (PROVERA) 10 MG tablet Take 1 tablet (10 mg total) by mouth 2 (two) times daily. for 10 days 9/3/19 9/13/19  Mercy Maki MD       Family History:  History reviewed. No pertinent family history.    Social History:  Social History  "    Tobacco Use    Smoking status: Heavy Tobacco Smoker   Substance Use Topics    Alcohol use: Yes     Alcohol/week: 2.0 standard drinks     Types: 2 Cans of beer per week    Drug use: Yes     Types: Marijuana       Review of Systems:  Pertinent positive as above. All other systems are reviewed and are negative.    Health Maintaince :   Primary Care Physician: None    Immunizations:   TDap states up to date    Flu states up to date     Cancer Screening:  PAP: states up to date       Objective:   Last 24 Hour Vital Signs:  BP  Min: 144/74  Max: 184/91  Temp  Av.6 °F (37 °C)  Min: 96.6 °F (35.9 °C)  Max: 99.8 °F (37.7 °C)  Pulse  Av.7  Min: 61  Max: 79  Resp  Av  Min: 18  Max: 22  SpO2  Av.8 %  Min: 97 %  Max: 100 %  Height  Av' 5" (165.1 cm)  Min: 5' 5" (165.1 cm)  Max: 5' 5" (165.1 cm)  Weight  Av.7 kg (180 lb 3.2 oz)  Min: 81.6 kg (180 lb)  Max: 81.8 kg (180 lb 6.4 oz)  Body mass index is 30.02 kg/m².  No intake/output data recorded.    Physical Examination:    General: Lethargic, actively vomiting, appears uncomfortable  Head:  Normocephalic and atraumatic  Eyes:  PERRL; EOMi with anicteric sclera and clear conjunctivae  Mouth:  Oropharynx clear and without exudate; moist mucous membranes  Cardio:  Regular rate and rhythm with normal S1 and S2; no murmurs or rubs  Resp:  CTAB; respirations unlabored; no wheezes, crackles or rhonchi  Abdom: Soft, NTND with normoactive bowel sounds  Extrem: Warm and well-perfused with no clubbing, cyanosis or edema  Skin:  No rashes, lesions, or color changes  Pulses: 2+ and symmetric distally  Neuro:  AAOx3; cooperative with no focal deficits      Laboratory:  Most Recent Data:  CBC:   Lab Results   Component Value Date    WBC 19.41 (H) 10/29/2019    HGB 14.0 10/29/2019    HCT 41.8 10/29/2019     (H) 10/29/2019    MCV 92 10/29/2019    RDW 13.7 10/29/2019     WBC Differential: 82.8 % N, 0 % Bands, 10.5 % L, 6.3 % M, 0.1 % Eo, 0.3 % Baso, no " additional cells seen  BMP:   Lab Results   Component Value Date     10/29/2019    K 3.5 10/29/2019     10/29/2019    CO2 21 (L) 10/29/2019    BUN 11 10/29/2019    CREATININE 0.54 10/29/2019     (H) 10/29/2019    CALCIUM 9.1 10/29/2019     LFTs:   Lab Results   Component Value Date    PROT 7.2 10/29/2019    ALBUMIN 4.4 10/29/2019    BILITOT 0.5 10/29/2019    AST 28 10/29/2019    ALKPHOS 104 10/29/2019    ALT 20 10/29/2019     Coags: No results found for: INR, PROTIME, PTT  FLP: No results found for: CHOL, HDL, LDLCALC, TRIG, CHOLHDL  DM:   Lab Results   Component Value Date    CREATININE 0.54 10/29/2019     Thyroid: No results found for: TSH, FREET4, X8NYVDW, Z4TYSKR, THYROIDAB  Anemia: No results found for: IRON, TIBC, FERRITIN, WNBGWDKD02, FOLATE  Cardiac: No results found for: TROPONINI, CKTOTAL, CKMB, BNP  Urinalysis:   Lab Results   Component Value Date    COLORU Yellow 10/29/2019    SPECGRAV 1.010 10/29/2019    NITRITE Negative 10/29/2019    KETONESU 2+ (A) 10/29/2019    UROBILINOGEN Negative 10/29/2019       Trended Lab Data:  Recent Labs   Lab 10/29/19  0711   WBC 19.41*   HGB 14.0   HCT 41.8   *   MCV 92   RDW 13.7      K 3.5      CO2 21*   BUN 11   CREATININE 0.54   *   PROT 7.2   ALBUMIN 4.4   BILITOT 0.5   AST 28   ALKPHOS 104   ALT 20       Trended Cardiac Data:  No results for input(s): TROPONINI, CKTOTAL, CKMB, BNP in the last 168 hours.    Microbiology Data:  None    Other Results:  EKG (my interpretation): normal sinus rhythm     Radiology:  Imaging Results          CT Abdomen Pelvis With Contrast (Final result)  Result time 10/29/19 13:54:27    Final result by SINDY Gil Sr., MD (10/29/19 13:54:27)                 Impression:      1. The appendix and the rest of the gastrointestinal system are normal in appearance.  2. The uterus and ovaries were not optimally visualized. There are findings characteristic of a 16 mm follicle in the left ovary.  If  additional imaging evaluation is clinically indicated, I recommend consideration of an ultrasound examination of the pelvis.  3. There are 4 lumbar-type vertebral bodies. There is a transitional vertebral body between L4 and the sacrum.  4. There are mild dependent atelectatic changes in both lungs.  5. The gallbladder is absent. There are surgical clips in the gallbladder fossa.  All CT scans at this facility use dose modulation, iterative reconstruction, and/or weight base dosing when appropriate to reduce radiation dose when appropriate to reduce radiation dose to as low as reasonably achievable.      Electronically signed by: Isma Gil MD  Date:    10/29/2019  Time:    13:54             Narrative:    EXAMINATION:  CT ABDOMEN PELVIS WITH CONTRAST    CLINICAL HISTORY:  Bowel obstruction, high-grade;    TECHNIQUE:  Standard abdomen and pelvis CT protocol with IV contrast was performed.  90 mL of Omnipaque 350 contrast material was used for this examination.  There was no oral contrast administered.    COMPARISON:  A KUB performed on 10/29/2019.    FINDINGS:  Finding: The size of the heart is within normal limits.  There are mild dependent atelectatic changes in both lungs.  There is no pneumothorax or pleural effusion.    The gallbladder is absent.  There are surgical clips in the gallbladder fossa.  The liver, pancreas, spleen, adrenals, and kidneys are normal in appearance. The ureters and the urinary bladder are normal in appearance.  The uterus and ovaries were not optimally visualized.  There are findings characteristic of a 16 mm follicle in the left ovary.  The appendix and the rest of the gastrointestinal system are normal in appearance. There is no free fluid within the abdomen or pelvis. There is no pneumoperitoneum.  There are 4 lumbar-type vertebral bodies.  There is a transitional vertebral body between L4 and the sacrum.                               X-Ray Abdomen AP 1 View (KUB) (Final result)   Result time 10/29/19 08:40:58    Final result by SINDY Gil Sr., MD (10/29/19 08:40:58)                 Impression:      1. There are surgical clips projected over the right upper quadrant of the abdomen.  2. There are 4 lumbar-type vertebral bodies. There is a transitional vertebral body between L4 and the sacrum.      Electronically signed by: Isma Gil MD  Date:    10/29/2019  Time:    08:40             Narrative:    EXAMINATION:  XR ABDOMEN AP 1 VIEW    CLINICAL HISTORY:  Upper abdominal pain, unspecified    COMPARISON:  None    FINDINGS:  The bowel gas pattern is normal in appearance. There is no pneumoperitoneum.  There are 4 lumbar-type vertebral bodies.  There is a transitional vertebral body between L4 and the sacrum.  There are surgical clips projected over the right upper quadrant of the abdomen.                                   Assessment:     Janell Keating is a 34 y.o. female with:  Patient Active Problem List    Diagnosis Date Noted    Intractable vomiting with nausea 10/29/2019        Plan:   Intractable Nausea and Vomiting  Patient with severe nausea and vomiting, likely secondary to alcohol and marijuana use. Received haldol, ketorolac, ativan, metoclopramide, phenergan, and zofran with persistent nausea and vomiting. Diffuse abdominal pain, non-tender to palpation.   -Zofran q6 hours and phenergan 25 mg q6 hours PRN  -D5 LRs with thiamine and folic acid    Essential Hypertension  BP elevated to 160s/70s, on home HCTZ 12.5 mg daily. Patient states has not taken BP medications in several months.   -Continue home HCTZ 12.5 mg     Hypothyroidism  Patient with history of hypothyroidism, on home synthroid 75 mcg. Presently stable.  -Continue home medication as above    Depression  Patient states history of depression, on fluoxetine 40 mg at home.   -Continue home medication as above    Diet: Liquids, advance as tolerated  DVT PPx: SCD  Dispo: pending improvement in nausea and vomiting      Code Status:     Full    Fan Morelso MD  [facility] Internal Medicine HO-I    [facility] Medicine Hospitalist Pager numbers:   [facility] Hospitalist Medicine Team A (Anna/Cami): [phone]  [facility] Hospitalist Medicine Team B (Kurt/Daryn):  [phone]

## 2019-10-30 NOTE — PROGRESS NOTES
"[facility] Medicine Progress Note    Primary Team: [facility] Hospitalist Team A  Attending Physician: Albina Almanza MD  Resident: Lakshmi Martínez MD  Intern: Silvio Gtz MD    Subjective:   Pt's nausea much improved since admission. Still with mild nausea currently. States that she has mild to moderate abdominal pain which she attributes to vomiting. No other issues. No fevers, chills, chest pain, SOB, abd pain, diarrhea.      Objective:     Last 24 Hour Vital Signs:  BP  Min: 105/56  Max: 184/91  Temp  Av.6 °F (37 °C)  Min: 96.6 °F (35.9 °C)  Max: 99.8 °F (37.7 °C)  Pulse  Av.8  Min: 61  Max: 79  Resp  Av  Min: 16  Max: 22  SpO2  Av.8 %  Min: 97 %  Max: 100 %  Height  Av' 5" (165.1 cm)  Min: 5' 5" (165.1 cm)  Max: 5' 5" (165.1 cm)  Weight  Av.7 kg (180 lb 3.2 oz)  Min: 81.6 kg (180 lb)  Max: 81.8 kg (180 lb 6.4 oz)  No intake/output data recorded.    Physical Examination:  Physical Exam   Constitutional: She is oriented to person, place, and time and well-developed, well-nourished, and in no distress. No distress.   HENT:   Head: Normocephalic and atraumatic.   Eyes: Right eye exhibits no discharge. Left eye exhibits no discharge.   Neck: Normal range of motion. Neck supple.   Cardiovascular: Normal rate and regular rhythm. Exam reveals no gallop and no friction rub.   No murmur heard.  Pulmonary/Chest: Effort normal and breath sounds normal. No stridor. No respiratory distress. She has no wheezes. She has no rales.   Abdominal: Soft. Bowel sounds are normal. She exhibits no distension. There is no tenderness.   Musculoskeletal: Normal range of motion. She exhibits no edema or deformity.   Neurological: She is alert and oriented to person, place, and time.   Skin: Skin is warm and dry. She is not diaphoretic.   Nursing note and vitals reviewed.      Laboratory:  Laboratory Data Reviewed:   Pertinent Findings:  CBC/CMP/TSH pending     Microbiology Data Reviewed: yes  Pertinent " Findings:  Microbiology Results (last 7 days)     ** No results found for the last 168 hours. **          Other Results:  EKG (my interpretation):  10/30:  Normal sinus rhythm with sinus arrhythmia  Possible Inferior infarct ,age undetermined  Abnormal ECG  No previous ECGs available    Radiology Data Reviewed:   Pertinent Findings:  Imaging Results          CT Abdomen Pelvis With Contrast (Final result)  Result time 10/29/19 13:54:27    Final result by SINDY Gil Sr., MD (10/29/19 13:54:27)                 Impression:      1. The appendix and the rest of the gastrointestinal system are normal in appearance.  2. The uterus and ovaries were not optimally visualized. There are findings characteristic of a 16 mm follicle in the left ovary.  If additional imaging evaluation is clinically indicated, I recommend consideration of an ultrasound examination of the pelvis.  3. There are 4 lumbar-type vertebral bodies. There is a transitional vertebral body between L4 and the sacrum.  4. There are mild dependent atelectatic changes in both lungs.  5. The gallbladder is absent. There are surgical clips in the gallbladder fossa.  All CT scans at this facility use dose modulation, iterative reconstruction, and/or weight base dosing when appropriate to reduce radiation dose when appropriate to reduce radiation dose to as low as reasonably achievable.      Electronically signed by: Isma Gil MD  Date:    10/29/2019  Time:    13:54             Narrative:    EXAMINATION:  CT ABDOMEN PELVIS WITH CONTRAST    CLINICAL HISTORY:  Bowel obstruction, high-grade;    TECHNIQUE:  Standard abdomen and pelvis CT protocol with IV contrast was performed.  90 mL of Omnipaque 350 contrast material was used for this examination.  There was no oral contrast administered.    COMPARISON:  A KUB performed on 10/29/2019.    FINDINGS:  Finding: The size of the heart is within normal limits.  There are mild dependent atelectatic changes in both  lungs.  There is no pneumothorax or pleural effusion.    The gallbladder is absent.  There are surgical clips in the gallbladder fossa.  The liver, pancreas, spleen, adrenals, and kidneys are normal in appearance. The ureters and the urinary bladder are normal in appearance.  The uterus and ovaries were not optimally visualized.  There are findings characteristic of a 16 mm follicle in the left ovary.  The appendix and the rest of the gastrointestinal system are normal in appearance. There is no free fluid within the abdomen or pelvis. There is no pneumoperitoneum.  There are 4 lumbar-type vertebral bodies.  There is a transitional vertebral body between L4 and the sacrum.                               X-Ray Abdomen AP 1 View (KUB) (Final result)  Result time 10/29/19 08:40:58    Final result by SINDY Gil Sr., MD (10/29/19 08:40:58)                 Impression:      1. There are surgical clips projected over the right upper quadrant of the abdomen.  2. There are 4 lumbar-type vertebral bodies. There is a transitional vertebral body between L4 and the sacrum.      Electronically signed by: Isma Gil MD  Date:    10/29/2019  Time:    08:40             Narrative:    EXAMINATION:  XR ABDOMEN AP 1 VIEW    CLINICAL HISTORY:  Upper abdominal pain, unspecified    COMPARISON:  None    FINDINGS:  The bowel gas pattern is normal in appearance. There is no pneumoperitoneum.  There are 4 lumbar-type vertebral bodies.  There is a transitional vertebral body between L4 and the sacrum.  There are surgical clips projected over the right upper quadrant of the abdomen.                                  Current Medications:     Infusions:   custom IV infusion builder 150 mL/hr at 10/30/19 0408        Scheduled:   FLUoxetine  40 mg Oral Daily    hydroCHLOROthiazide  12.5 mg Oral Daily    levothyroxine  75 mcg Oral Before breakfast    ondansetron  8 mg Intravenous Q6H        PRN:  influenza, promethazine (PHENERGAN)  IVPB    Antibiotics and Day Number of Therapy:      Lines and Day Number of Therapy:  PIV right wrist    Assessment:     Janell Keating is a 34 y.o.female with  Patient Active Problem List    Diagnosis Date Noted    Intractable vomiting with nausea 10/29/2019    Mild tetrahydrocannabinol (THC) abuse 10/29/2019    Alcohol abuse 10/29/2019    Essential hypertension 10/29/2019    Acquired hypothyroidism 10/29/2019    Depression 10/29/2019    Upper abdominal pain         Plan:   Intractable Nausea and Vomiting  Patient with severe nausea and vomiting, likely secondary to alcohol and marijuana use. Received haldol, ketorolac, ativan, metoclopramide, phenergan, and zofran with persistent nausea and vomiting. Diffuse abdominal pain, non-tender to palpation.   -Zofran q6 hours and phenergan 25 mg q6 hours PRN  -D5 LRs with thiamine and folic acid given  -This am, nausea improving and still with abd pain - Will give IV ketorolac Q6.      Essential Hypertension  BP elevated to 160s/70s on admission, on home HCTZ 12.5 mg daily. Patient states has not taken BP medications in several months.   -Continue home HCTZ 12.5 mg      Hypothyroidism  Patient with history of hypothyroidism, on home synthroid 75 mcg. Presently stable.  -Continue home medication as above  -Pending TSH     Depression  Patient states history of depression, on fluoxetine 40 mg at home.   -Continue home medication as above     Diet: Liquids, advance as tolerated  DVT PPx: SCD  Dispo: pending improvement in nausea and vomiting       Silvio Gtz MD  [facility] Medicine HO-1    [facility] Medicine Hospitalist Pager numbers:   [facility] Hospitalist Medicine Team A (Anna/Cami): [phone]  [facility] Hospitalist Medicine Team B (Kurt/Daryn):  [phone]

## 2019-10-30 NOTE — PROGRESS NOTES
Pharmacy New Medication Education    Patient and/or Caregiver ACCEPTED medication education.    Pharmacy has provided education on the name, indication, and possible side effects of the medication(s) prescribed, using teach-back method.     Learners of pharmacy medication education includes:  patient      The following medications have also been discussed, during this admission.     Current Facility-Administered Medications   Medication Frequency    dextrose 5% lactated ringers 1,000 mL with thiamine 100 mg, folic acid 1 mg infusion Continuous    FLUoxetine capsule 40 mg Daily    hydroCHLOROthiazide tablet 12.5 mg Daily    ketorolac injection 15 mg Q6H    levothyroxine tablet 75 mcg Before breakfast    ondansetron injection 8 mg Q6H    potassium chloride SA CR tablet 40 mEq Once    promethazine (PHENERGAN) 25 mg in dextrose 5 % 50 mL IVPB Q6H PRN     Current Outpatient Medications   Medication Sig    dicyclomine (BENTYL) 10 MG capsule Take 1 capsule (10 mg total) by mouth 4 (four) times daily before meals and nightly. for 3 days    FLUoxetine 40 MG capsule Take 40 mg by mouth once daily.    levothyroxine (SYNTHROID) 75 MCG tablet Take 1 tablet (75 mcg total) by mouth before breakfast.    ondansetron (ZOFRAN) 4 MG tablet Take 1 tablet (4 mg total) by mouth every 6 (six) hours as needed for Nausea.          Thank you  Soy Duarte, PharmD

## 2019-10-30 NOTE — PROGRESS NOTES
Smoking cessation education provided.  Handout provided for 1-800-QUIT-NOW smoking cessation program.

## 2019-10-30 NOTE — DISCHARGE SUMMARY
[facility] Medicine Discharge Summary    Primary Team: [facility] Hospitalist Team A  Attending Physician: Albina Almanza MD  Resident: Tanya  Intern: Jethro    Date of Admit: 10/29/2019  Date of Discharge: 10/30/2019    Discharge to: Home  Condition: Improved    Discharge Diagnoses     Patient Active Problem List   Diagnosis    Intractable vomiting with nausea    Mild tetrahydrocannabinol (THC) abuse    Alcohol abuse    Essential hypertension    Acquired hypothyroidism    Depression    Upper abdominal pain       Consultants and Procedures     Consultants:  None    Procedures:   None    Imaging:  Abdominal X-ray: transitional vertebral body between L4 and sacrum  CT abdomen pelvis: GI system normal, absent gallbladder    Brief History of Present Illness   Janell Keating is a 34 y.o. female with past medical history of hypothyroidism, hypertension, and depression who presented to [facility] with nausea and vomiting (non-bloody) for 18 hours. Patient is actively vomiting during interview. Patient states that she had a lot to drink yesterday although is unable to quantify the amount of alcohol she had. She also smoked marijuana last night as well. Starting at 2:30 am she started have severe nausea and vomiting along with diffuse abdominal cramping. She denies fevers, chills, diarrhea, constipation, dysuria. States decreased appetite since nausea and vomiting began.     For the full HPI please refer to the History & Physical from this admission.    Hospital Course By Problem with Pertinent Findings   Intractable Nausea and Vomiting  Patient with severe nausea and vomiting, likely secondary to alcohol and marijuana use. Received haldol, ketorolac, ativan, metoclopramide, phenergan, and zofran with persistent nausea and vomiting. Diffuse abdominal pain, non-tender to palpation. Symptomatic improvement with zofran and phenergan and D5 LRs with thiamine and folic acid.   -Zofran q6 hours PRN for 3 days and Bentyl 10 mg qid  PRN for 3 days     Essential Hypertension  BP elevated to 160s/70s on admission, on home HCTZ 12.5 mg daily. Patient states has not taken BP medications in several months.   -Discontinue HCTZ in setting of acute N/V, patient can resume HCTZ after acute illness      Hypothyroidism  Patient with history of hypothyroidism, on home synthroid 75 mcg. Presently stable, TSH wnl.   -Continue home medication as above     Depression  Patient states history of depression, on fluoxetine 40 mg at home.   -Continue home medication as above    Discharge Medications      Janell Keating   Home Medication Instructions BRODIE:[id number]    Printed on:10/30/19 0871   Medication Information                      dicyclomine (BENTYL) 10 MG capsule  Take 1 capsule (10 mg total) by mouth 4 (four) times daily before meals and nightly. for 3 days             FLUoxetine 40 MG capsule  Take 40 mg by mouth once daily.             levothyroxine (SYNTHROID) 75 MCG tablet  Take 1 tablet (75 mcg total) by mouth before breakfast.             ondansetron (ZOFRAN) 4 MG tablet  Take 1 tablet (4 mg total) by mouth every 6 (six) hours as needed for Nausea.                 Discharge Information:   Diet:  Regular    Physical Activity:  As tolerated             Instructions:  1. Take all medications as prescribed  2. Keep all follow-up appointments  3. Return to the hospital or call your primary care physicians if any worsening symptoms such as fever, chest pain, shortness of breath, return of symptoms, or any other concerns.    Follow-Up Appointments:  PCP    Fan Morelos MD  [facility] Internal Medicine, -I

## 2019-10-31 ENCOUNTER — HOSPITAL ENCOUNTER (EMERGENCY)
Facility: HOSPITAL | Age: 34
Discharge: HOME | End: 2019-10-31
Attending: SURGERY
Payer: COMMERCIAL

## 2019-10-31 ENCOUNTER — HOSPITAL ENCOUNTER (EMERGENCY)
Facility: HOSPITAL | Age: 34
Discharge: HOME | End: 2019-10-31
Attending: EMERGENCY MEDICINE
Payer: COMMERCIAL

## 2019-10-31 VITALS
HEART RATE: 67 BPM | DIASTOLIC BLOOD PRESSURE: 112 MMHG | WEIGHT: 180 LBS | RESPIRATION RATE: 20 BRPM | SYSTOLIC BLOOD PRESSURE: 152 MMHG | BODY MASS INDEX: 29.99 KG/M2 | TEMPERATURE: 98 F | HEIGHT: 65 IN | OXYGEN SATURATION: 99 %

## 2019-10-31 VITALS
RESPIRATION RATE: 18 BRPM | WEIGHT: 170 LBS | BODY MASS INDEX: 28.32 KG/M2 | OXYGEN SATURATION: 99 % | HEIGHT: 65 IN | TEMPERATURE: 99 F | DIASTOLIC BLOOD PRESSURE: 100 MMHG | HEART RATE: 71 BPM | SYSTOLIC BLOOD PRESSURE: 199 MMHG

## 2019-10-31 DIAGNOSIS — R11.2 NON-INTRACTABLE VOMITING WITH NAUSEA, UNSPECIFIED VOMITING TYPE: Primary | ICD-10-CM

## 2019-10-31 LAB
ALBUMIN SERPL BCP-MCNC: 4.2 G/DL (ref 3.5–5.2)
ALP SERPL-CCNC: 86 U/L (ref 55–135)
ALT SERPL W/O P-5'-P-CCNC: 18 U/L (ref 10–44)
AMORPH CRY URNS QL MICRO: ABNORMAL
ANION GAP SERPL CALC-SCNC: 15 MMOL/L (ref 8–16)
AST SERPL-CCNC: 21 U/L (ref 10–40)
B-HCG UR QL: NEGATIVE
BACTERIA #/AREA URNS HPF: ABNORMAL /HPF
BASOPHILS # BLD AUTO: 0.01 K/UL (ref 0–0.2)
BASOPHILS NFR BLD: 0.1 % (ref 0–1.9)
BILIRUB SERPL-MCNC: 0.6 MG/DL (ref 0.1–1)
BILIRUB UR QL STRIP: ABNORMAL
BUN SERPL-MCNC: 9 MG/DL (ref 6–20)
CALCIUM SERPL-MCNC: 10.1 MG/DL (ref 8.7–10.5)
CHLORIDE SERPL-SCNC: 99 MMOL/L (ref 95–110)
CLARITY UR: ABNORMAL
CO2 SERPL-SCNC: 26 MMOL/L (ref 23–29)
COLOR UR: YELLOW
CREAT SERPL-MCNC: 0.8 MG/DL (ref 0.5–1.4)
CTP QC/QA: YES
DIFFERENTIAL METHOD: ABNORMAL
EOSINOPHIL # BLD AUTO: 0.1 K/UL (ref 0–0.5)
EOSINOPHIL NFR BLD: 0.4 % (ref 0–8)
ERYTHROCYTE [DISTWIDTH] IN BLOOD BY AUTOMATED COUNT: 13.3 % (ref 11.5–14.5)
EST. GFR  (AFRICAN AMERICAN): >60 ML/MIN/1.73 M^2
EST. GFR  (NON AFRICAN AMERICAN): >60 ML/MIN/1.73 M^2
GLUCOSE SERPL-MCNC: 101 MG/DL (ref 70–110)
GLUCOSE UR QL STRIP: NEGATIVE
HCT VFR BLD AUTO: 42.2 % (ref 37–48.5)
HGB BLD-MCNC: 14.2 G/DL (ref 12–16)
HGB UR QL STRIP: ABNORMAL
HYALINE CASTS #/AREA URNS LPF: 0 /LPF
KETONES UR QL STRIP: ABNORMAL
LEUKOCYTE ESTERASE UR QL STRIP: NEGATIVE
LIPASE SERPL-CCNC: <3 U/L (ref 4–60)
LYMPHOCYTES # BLD AUTO: 2.5 K/UL (ref 1–4.8)
LYMPHOCYTES NFR BLD: 21.3 % (ref 18–48)
MCH RBC QN AUTO: 30.5 PG (ref 27–31)
MCHC RBC AUTO-ENTMCNC: 33.6 G/DL (ref 32–36)
MCV RBC AUTO: 91 FL (ref 82–98)
MICROSCOPIC COMMENT: ABNORMAL
MONOCYTES # BLD AUTO: 0.9 K/UL (ref 0.3–1)
MONOCYTES NFR BLD: 7.5 % (ref 4–15)
NEUTROPHILS # BLD AUTO: 8.1 K/UL (ref 1.8–7.7)
NEUTROPHILS NFR BLD: 70.7 % (ref 38–73)
NITRITE UR QL STRIP: NEGATIVE
PH UR STRIP: >8 [PH] (ref 5–8)
PLATELET # BLD AUTO: 398 K/UL (ref 150–350)
PMV BLD AUTO: 10.3 FL (ref 9.2–12.9)
POTASSIUM SERPL-SCNC: 3.2 MMOL/L (ref 3.5–5.1)
PROT SERPL-MCNC: 7.3 G/DL (ref 6–8.4)
PROT UR QL STRIP: ABNORMAL
RBC # BLD AUTO: 4.65 M/UL (ref 4–5.4)
RBC #/AREA URNS HPF: 48 /HPF (ref 0–4)
SODIUM SERPL-SCNC: 140 MMOL/L (ref 136–145)
SP GR UR STRIP: 1.01 (ref 1–1.03)
SQUAMOUS #/AREA URNS HPF: 12 /HPF
URN SPEC COLLECT METH UR: ABNORMAL
UROBILINOGEN UR STRIP-ACNC: 1 EU/DL
WBC # BLD AUTO: 11.57 K/UL (ref 3.9–12.7)
WBC #/AREA URNS HPF: 3 /HPF (ref 0–5)
WBC CLUMPS URNS QL MICRO: ABNORMAL

## 2019-10-31 PROCEDURE — 81000 URINALYSIS NONAUTO W/SCOPE: CPT

## 2019-10-31 PROCEDURE — 25000003 PHARM REV CODE 250: Performed by: EMERGENCY MEDICINE

## 2019-10-31 PROCEDURE — 63600175 PHARM REV CODE 636 W HCPCS: Mod: ER | Performed by: PHYSICIAN ASSISTANT

## 2019-10-31 PROCEDURE — 83690 ASSAY OF LIPASE: CPT

## 2019-10-31 PROCEDURE — 25000003 PHARM REV CODE 250: Mod: ER | Performed by: PHYSICIAN ASSISTANT

## 2019-10-31 PROCEDURE — 99283 EMERGENCY DEPT VISIT LOW MDM: CPT | Mod: 25,27

## 2019-10-31 PROCEDURE — 85025 COMPLETE CBC W/AUTO DIFF WBC: CPT

## 2019-10-31 PROCEDURE — 96365 THER/PROPH/DIAG IV INF INIT: CPT | Mod: ER

## 2019-10-31 PROCEDURE — 99284 EMERGENCY DEPT VISIT MOD MDM: CPT | Mod: 25,ER

## 2019-10-31 PROCEDURE — 81025 URINE PREGNANCY TEST: CPT | Performed by: EMERGENCY MEDICINE

## 2019-10-31 PROCEDURE — 96375 TX/PRO/DX INJ NEW DRUG ADDON: CPT | Mod: ER

## 2019-10-31 PROCEDURE — 80053 COMPREHEN METABOLIC PANEL: CPT

## 2019-10-31 PROCEDURE — 96361 HYDRATE IV INFUSION ADD-ON: CPT | Mod: ER

## 2019-10-31 RX ORDER — ONDANSETRON 2 MG/ML
4 INJECTION INTRAMUSCULAR; INTRAVENOUS
Status: COMPLETED | OUTPATIENT
Start: 2019-10-31 | End: 2019-10-31

## 2019-10-31 RX ORDER — CLONIDINE HYDROCHLORIDE 0.1 MG/1
0.1 TABLET ORAL
Status: COMPLETED | OUTPATIENT
Start: 2019-10-31 | End: 2019-10-31

## 2019-10-31 RX ORDER — ONDANSETRON 4 MG/1
4 TABLET, ORALLY DISINTEGRATING ORAL
Status: COMPLETED | OUTPATIENT
Start: 2019-10-31 | End: 2019-10-31

## 2019-10-31 RX ORDER — ONDANSETRON 2 MG/ML
4 INJECTION INTRAMUSCULAR; INTRAVENOUS
Status: DISCONTINUED | OUTPATIENT
Start: 2019-10-31 | End: 2019-10-31 | Stop reason: HOSPADM

## 2019-10-31 RX ORDER — ONDANSETRON 4 MG/1
4 TABLET, ORALLY DISINTEGRATING ORAL EVERY 6 HOURS PRN
Qty: 12 TABLET | Refills: 0 | Status: SHIPPED | OUTPATIENT
Start: 2019-10-31 | End: 2019-11-01

## 2019-10-31 RX ADMIN — CLONIDINE HYDROCHLORIDE 0.1 MG: 0.1 TABLET ORAL at 05:10

## 2019-10-31 RX ADMIN — SODIUM CHLORIDE 1000 ML: 0.9 INJECTION, SOLUTION INTRAVENOUS at 04:10

## 2019-10-31 RX ADMIN — PROMETHAZINE HYDROCHLORIDE 12.5 MG: 25 INJECTION INTRAMUSCULAR; INTRAVENOUS at 05:10

## 2019-10-31 RX ADMIN — ONDANSETRON 4 MG: 4 TABLET, ORALLY DISINTEGRATING ORAL at 08:10

## 2019-10-31 RX ADMIN — ONDANSETRON 4 MG: 2 INJECTION INTRAMUSCULAR; INTRAVENOUS at 04:10

## 2019-10-31 NOTE — ED NOTES
Pt presents to the ED w/ c/ of continuous n/v and generalized abd cramping. Pt reports that she was recently admitted and discharged from hospital on 10/30 for the same symptoms. Pt reports that she was given prescriptions for bentyl and zofran but reports has not been able to keep anything down. Pt reports intermittent nausea and vomiting and generalized abd cramping. Pt reports that she has smokes marijuana and drank alcohol recently.

## 2019-10-31 NOTE — ED PROVIDER NOTES
"Encounter Date: 10/31/2019       History     Chief Complaint   Patient presents with    Abdominal Pain     "I am having stomach pain and vomiting again and I was here the other day, I got out and went to another ER this morning because I still feel nauseated but they couldn't do anything so I came here" denies fever     34-year-old female presents to the emergency department for evaluation of vomiting and generalized abdominal pain. Patient reports that she was admitted for intractable vomiting on 10/29/2019.  She reports that she was discharged yesterday afternoon but was still having vomiting. She reports that she was evaluated at [facility] emergency department this morning for abdominal pain and vomiting. She reports that once being discharged from the facility she was home and had 1 more episode of vomiting or she noticed some bright red blood.  She denies any headache, dizziness, vision changes, neck pain, chest pain or diarrhea.  She reports her last menstrual period started on 10/26/2019 and she is still currently on it.  She denies any flank pain, vaginal discharge, or dysuria.  She states that she was prescribed Zofran but has yet to fill the prescription.        Review of patient's allergies indicates:   Allergen Reactions    Sulfa (sulfonamide antibiotics) Hives     Past Medical History:   Diagnosis Date    Depression     Hypothyroidism      Past Surgical History:   Procedure Laterality Date    GALLBLADDER SURGERY       No family history on file.  Social History     Tobacco Use    Smoking status: Heavy Tobacco Smoker    Tobacco comment: Handout provided for 1-800-QUIT-NOW smoking cessation program   Substance Use Topics    Alcohol use: Yes     Alcohol/week: 2.0 standard drinks     Types: 2 Cans of beer per week    Drug use: Yes     Types: Marijuana     Review of Systems   Constitutional: Negative for activity change, appetite change and fever.   HENT: Negative for congestion, rhinorrhea, sinus " pressure, sore throat, trouble swallowing and voice change.    Eyes: Negative for visual disturbance.   Respiratory: Negative for cough, chest tightness, shortness of breath and wheezing.    Cardiovascular: Negative for chest pain.   Gastrointestinal: Positive for abdominal pain, nausea and vomiting. Negative for constipation and diarrhea.   Genitourinary: Negative for decreased urine volume, dysuria, vaginal bleeding and vaginal discharge.   Musculoskeletal: Negative for back pain, joint swelling, neck pain and neck stiffness.   Skin: Negative for rash.   Neurological: Negative for dizziness, syncope, weakness, light-headedness, numbness and headaches.       Physical Exam     Initial Vitals [10/31/19 1553]   BP Pulse Resp Temp SpO2   (!) 180/109 66 (!) 21 98.8 °F (37.1 °C) 99 %      MAP       --         Physical Exam    Nursing note and vitals reviewed.  Constitutional: She appears well-developed and well-nourished. She is not diaphoretic. No distress.   HENT:   Head: Normocephalic and atraumatic.   Right Ear: External ear normal.   Left Ear: External ear normal.   Nose: Nose normal.   Mouth/Throat: Oropharynx is clear and moist.   Eyes: Conjunctivae and EOM are normal. Pupils are equal, round, and reactive to light.   Neck: Normal range of motion. Neck supple.   Cardiovascular: Normal rate, regular rhythm and normal heart sounds.   Pulmonary/Chest: Breath sounds normal. No respiratory distress. She has no wheezes. She has no rhonchi. She has no rales. She exhibits no tenderness.   Abdominal: Soft. Bowel sounds are normal. She exhibits no distension. There is generalized tenderness (Mild). There is no rebound, no CVA tenderness, no tenderness at McBurney's point and negative Copeland's sign.   Lymphadenopathy:     She has no cervical adenopathy.   Neurological: She is alert and oriented to person, place, and time.   Skin: Skin is warm and dry.   Psychiatric: She has a normal mood and affect.         ED Course    Procedures  Labs Reviewed - No data to display       Imaging Results    None          Medical Decision Making:   Initial Assessment:   34-year-old female presents for evaluation of continued generalized abdominal pain and vomiting. Physical exam reveals a nontoxic-appearing female in no acute distress. Patient is afebrile, hypertensive but other vital signs within normal limits.  Neurological exam reveals an alert and oriented patient.  Abdominal exam reveals soft abdomen, mild generalized tenderness to palpation noted on exam.  No peritoneal signs noted. No CVA tenderness noted.  Differential Diagnosis:   I carefully considered but doubt serious intra-abdominal etiology including acute appendicitis, acute cholecystitis or bowel obstruction.  No imaging indicated at this time.  Gastroenteritis    ED Management:  I reviewed the blood work and urinalysis that was taken this morning.  No repeat lab work is indicated at this time.  Patient given saline, Zofran and Phenergan for symptom control.  Patient reports that she is out of her blood pressure medication for the last month.  She states that she will be picking up her blood pressure medication from the pharmacy today.  Patient given clonidine in the emergency department.  I discussed this patient at length with  who is in agreement with the course of treatment.  Instructed patient to follow up with her primary care provider for re-evaluation and to return to the emergency department immediately for any new or worsening symptoms.                      Clinical Impression:       ICD-10-CM ICD-9-CM   1. Non-intractable vomiting with nausea, unspecified vomiting type R11.2 787.01                                Iwona Altamirano PA-C  10/31/19 1748

## 2019-10-31 NOTE — DISCHARGE INSTRUCTIONS
You are advised to keep the appointment with your gastroenterologist for re-evaluation next week.  You are instructed to return to the emergency department immediately for any new or worsening symptoms.

## 2019-10-31 NOTE — ED NOTES
MD wrote discharge instructions and prescription for patient. RN paged patient. Pt said that she was in lobby about to leave. RN brought prescriptions to patient. Pt refused discharge vitals. Pt is NAD.

## 2019-10-31 NOTE — ED PROVIDER NOTES
Encounter Date: 10/31/2019    SCRIBE #1 NOTE: I, Myah Flores, am scribing for, and in the presence of,  Dr. Galloway. I have scribed the entire note.       History     Chief Complaint   Patient presents with    Emesis     pt presents to ED today c/o vomiting pt reports she was seen and admitted yesterday for vomiting and has no relief with home rx of zofran.      Time seen by provider: 8:26 AM    This is a 34 y.o. female who presents with complaint of n/v.  The patient was seen in the ED yesterday, and was instructed to return to the ED if symptoms persisted.  Associated symptoms include burning abdominal pain and dehydration.  She has not experienced dysuria, nor has she been able to urinate much.  Her symptoms have persisted for 4 days.  The patient admits to drinking ETOH and smoking weed 3 days ago.    The history is provided by the patient.     Review of patient's allergies indicates:   Allergen Reactions    Sulfa (sulfonamide antibiotics) Hives     Past Medical History:   Diagnosis Date    Depression     Hypothyroidism      Past Surgical History:   Procedure Laterality Date    GALLBLADDER SURGERY       History reviewed. No pertinent family history.  Social History     Tobacco Use    Smoking status: Heavy Tobacco Smoker    Tobacco comment: Handout provided for 1-800-QUIT-NOW smoking cessation program   Substance Use Topics    Alcohol use: Yes     Alcohol/week: 2.0 standard drinks     Types: 2 Cans of beer per week    Drug use: Yes     Types: Marijuana     Review of Systems   Constitutional: Negative for chills and fever.   HENT: Negative for ear pain and sore throat.    Eyes: Negative for redness.   Respiratory: Negative for shortness of breath.    Cardiovascular: Negative for chest pain.   Gastrointestinal: Positive for abdominal pain, nausea and vomiting. Negative for diarrhea.   Genitourinary: Negative for dysuria.   Musculoskeletal: Negative for back pain.   Skin: Negative for rash.   Neurological:  Negative for headaches.       Physical Exam     Initial Vitals [10/31/19 0747]   BP Pulse Resp Temp SpO2   (!) 152/112 67 20 98.2 °F (36.8 °C) 99 %      MAP       --         Physical Exam    Nursing note and vitals reviewed.  Constitutional: She appears well-developed and well-nourished. She is not diaphoretic. No distress.   HENT:   Head: Normocephalic and atraumatic.   Mouth/Throat: Oropharynx is clear and moist.   Eyes: Conjunctivae and EOM are normal. Pupils are equal, round, and reactive to light.   Neck: Normal range of motion. Neck supple.   Cardiovascular: Normal rate, regular rhythm and normal heart sounds. Exam reveals no gallop and no friction rub.    No murmur heard.  Pulmonary/Chest: Breath sounds normal. She has no wheezes. She has no rhonchi. She has no rales.   Abdominal: Soft. Bowel sounds are normal. There is tenderness (diffuse). There is no rebound and no guarding.   Musculoskeletal: Normal range of motion. She exhibits no edema or tenderness (CVA).   Lymphadenopathy:     She has no cervical adenopathy.   Neurological: She is alert and oriented to person, place, and time. She has normal strength.   Skin: Skin is warm and dry. Capillary refill takes less than 2 seconds. No rash noted.         ED Course   Procedures  Labs Reviewed   CBC W/ AUTO DIFFERENTIAL   COMPREHENSIVE METABOLIC PANEL   LIPASE   URINALYSIS, REFLEX TO URINE CULTURE   POCT URINE PREGNANCY          Imaging Results    None          Medical Decision Making:   Clinical Tests:   Lab Tests: Reviewed and Ordered                   ED Course as of Oct 31 0904   Thu Oct 31, 2019   0903 The patient is feeling better and states she wants to leave.  She does not want fluids she does not want wait for her labs.    [ST]      ED Course User Index  [ST] Nicole Galloway MD     Clinical Impression:       ICD-10-CM ICD-9-CM   1. Non-intractable vomiting with nausea, unspecified vomiting type R11.2 787.01               I, Nicole Galloway, personally  performed the services described in this documentation. All medical record entries made by the scribe were at my direction and in my presence.  I have reviewed the chart and agree that the record reflects my personal performance and is accurate and complete. Nicole Galloway M.D. 9:05 AM10/31/2019                 Nicole Galloway MD  10/31/19 0905

## 2019-11-01 ENCOUNTER — HOSPITAL ENCOUNTER (EMERGENCY)
Facility: HOSPITAL | Age: 34
Discharge: HOME | End: 2019-11-01
Attending: EMERGENCY MEDICINE
Payer: COMMERCIAL

## 2019-11-01 VITALS
RESPIRATION RATE: 18 BRPM | OXYGEN SATURATION: 97 % | HEIGHT: 65 IN | HEART RATE: 64 BPM | DIASTOLIC BLOOD PRESSURE: 91 MMHG | SYSTOLIC BLOOD PRESSURE: 185 MMHG | TEMPERATURE: 98 F | WEIGHT: 180 LBS | BODY MASS INDEX: 29.99 KG/M2

## 2019-11-01 DIAGNOSIS — R11.15 CYCLICAL VOMITING SYNDROME NOT ASSOCIATED WITH MIGRAINE: Primary | ICD-10-CM

## 2019-11-01 DIAGNOSIS — R11.10 VOMITING: ICD-10-CM

## 2019-11-01 LAB
ALBUMIN SERPL BCP-MCNC: 4.2 G/DL (ref 3.5–5.2)
ALP SERPL-CCNC: 87 U/L (ref 38–126)
ALT SERPL W/O P-5'-P-CCNC: 28 U/L (ref 10–44)
ANION GAP SERPL CALC-SCNC: 11 MMOL/L (ref 8–16)
AST SERPL-CCNC: 28 U/L (ref 15–46)
BILIRUB SERPL-MCNC: 0.7 MG/DL (ref 0.1–1)
BUN SERPL-MCNC: 17 MG/DL (ref 7–17)
CALCIUM SERPL-MCNC: 9.3 MG/DL (ref 8.7–10.5)
CHLORIDE SERPL-SCNC: 96 MMOL/L (ref 95–110)
CO2 SERPL-SCNC: 30 MMOL/L (ref 23–29)
CREAT SERPL-MCNC: 0.91 MG/DL (ref 0.5–1.4)
EST. GFR  (AFRICAN AMERICAN): >60 ML/MIN/1.73 M^2
EST. GFR  (NON AFRICAN AMERICAN): >60 ML/MIN/1.73 M^2
GLUCOSE SERPL-MCNC: 104 MG/DL (ref 70–110)
POTASSIUM SERPL-SCNC: 2.9 MMOL/L (ref 3.5–5.1)
PROT SERPL-MCNC: 7.1 G/DL (ref 6–8.4)
SODIUM SERPL-SCNC: 137 MMOL/L (ref 136–145)

## 2019-11-01 PROCEDURE — 63600175 PHARM REV CODE 636 W HCPCS: Mod: ER | Performed by: EMERGENCY MEDICINE

## 2019-11-01 PROCEDURE — 80053 COMPREHEN METABOLIC PANEL: CPT | Mod: ER

## 2019-11-01 PROCEDURE — 63600175 PHARM REV CODE 636 W HCPCS: Mod: ER

## 2019-11-01 PROCEDURE — 99284 EMERGENCY DEPT VISIT MOD MDM: CPT | Mod: 25,ER

## 2019-11-01 PROCEDURE — 96361 HYDRATE IV INFUSION ADD-ON: CPT | Mod: ER

## 2019-11-01 PROCEDURE — 96375 TX/PRO/DX INJ NEW DRUG ADDON: CPT | Mod: 59,ER

## 2019-11-01 PROCEDURE — 96365 THER/PROPH/DIAG IV INF INIT: CPT | Mod: ER

## 2019-11-01 RX ORDER — SODIUM CHLORIDE 9 MG/ML
500 INJECTION, SOLUTION INTRAVENOUS
Status: DISCONTINUED | OUTPATIENT
Start: 2019-11-01 | End: 2019-11-01

## 2019-11-01 RX ORDER — PROCHLORPERAZINE EDISYLATE 5 MG/ML
INJECTION INTRAMUSCULAR; INTRAVENOUS
Status: COMPLETED
Start: 2019-11-01 | End: 2019-11-01

## 2019-11-01 RX ORDER — ONDANSETRON 4 MG/1
8 TABLET, ORALLY DISINTEGRATING ORAL EVERY 6 HOURS PRN
Qty: 12 TABLET | Refills: 0 | Status: SHIPPED | OUTPATIENT
Start: 2019-11-01

## 2019-11-01 RX ORDER — FLUOXETINE 20 MG/1
20 TABLET ORAL DAILY
Qty: 30 TABLET | Refills: 11 | Status: SHIPPED | OUTPATIENT
Start: 2019-11-01 | End: 2020-10-31

## 2019-11-01 RX ORDER — PROMETHAZINE HYDROCHLORIDE 25 MG/1
25 SUPPOSITORY RECTAL EVERY 6 HOURS PRN
Qty: 10 SUPPOSITORY | Refills: 0 | Status: SHIPPED | OUTPATIENT
Start: 2019-11-01

## 2019-11-01 RX ORDER — KETOROLAC TROMETHAMINE 30 MG/ML
30 INJECTION, SOLUTION INTRAMUSCULAR; INTRAVENOUS
Status: COMPLETED | OUTPATIENT
Start: 2019-11-01 | End: 2019-11-01

## 2019-11-01 RX ORDER — POTASSIUM CHLORIDE 20 MEQ/1
20 TABLET, EXTENDED RELEASE ORAL DAILY
Qty: 30 TABLET | Refills: 0 | Status: SHIPPED | OUTPATIENT
Start: 2019-11-01

## 2019-11-01 RX ORDER — PROCHLORPERAZINE EDISYLATE 5 MG/ML
10 INJECTION INTRAMUSCULAR; INTRAVENOUS
Status: COMPLETED | OUTPATIENT
Start: 2019-11-01 | End: 2019-11-01

## 2019-11-01 RX ORDER — POTASSIUM CHLORIDE 7.45 MG/ML
10 INJECTION INTRAVENOUS
Status: COMPLETED | OUTPATIENT
Start: 2019-11-01 | End: 2019-11-01

## 2019-11-01 RX ORDER — LEVOTHYROXINE SODIUM 75 UG/1
75 TABLET ORAL DAILY
Qty: 30 TABLET | Refills: 11 | Status: SHIPPED | OUTPATIENT
Start: 2019-11-01 | End: 2020-10-31

## 2019-11-01 RX ORDER — METOCLOPRAMIDE HYDROCHLORIDE 5 MG/ML
10 INJECTION INTRAMUSCULAR; INTRAVENOUS
Status: COMPLETED | OUTPATIENT
Start: 2019-11-01 | End: 2019-11-01

## 2019-11-01 RX ADMIN — SODIUM CHLORIDE, SODIUM LACTATE, POTASSIUM CHLORIDE, AND CALCIUM CHLORIDE 1000 ML: .6; .31; .03; .02 INJECTION, SOLUTION INTRAVENOUS at 06:11

## 2019-11-01 RX ADMIN — PROCHLORPERAZINE EDISYLATE 10 MG: 5 INJECTION INTRAMUSCULAR; INTRAVENOUS at 05:11

## 2019-11-01 RX ADMIN — METOCLOPRAMIDE 10 MG: 5 INJECTION, SOLUTION INTRAMUSCULAR; INTRAVENOUS at 05:11

## 2019-11-01 RX ADMIN — SODIUM CHLORIDE 250 ML: 0.9 INJECTION, SOLUTION INTRAVENOUS at 08:11

## 2019-11-01 RX ADMIN — POTASSIUM CHLORIDE 10 MEQ: 7.46 INJECTION, SOLUTION INTRAVENOUS at 06:11

## 2019-11-01 RX ADMIN — KETOROLAC TROMETHAMINE 30 MG: 30 INJECTION, SOLUTION INTRAMUSCULAR at 05:11

## 2019-11-01 NOTE — ED PROVIDER NOTES
Encounter Date: 11/1/2019       History     Chief Complaint   Patient presents with    Abdominal Pain     HPI   This is a 34 y.o. female who has a past medical history of Depression and Hypothyroidism.     The patient presents to the Emergency Department with nausea and vomiting.  Patient was seen by me and subsequently 2 other times for similar symptoms consisting of epigastric abdominal pain, nausea and intractable vomiting.  Patient denies any further alcohol or marijuana use.  She states that she can drink or eat anything at home.  She states that she is miserable.  She also states that she has some mild lower abdominal/pelvic pain.  She denies fever or chills, URI symptoms blood in vomit stool.    Review of patient's allergies indicates:   Allergen Reactions    Sulfa (sulfonamide antibiotics) Hives     Past Medical History:   Diagnosis Date    Depression     Hypothyroidism      Past Surgical History:   Procedure Laterality Date    GALLBLADDER SURGERY       No family history on file.  Social History     Tobacco Use    Smoking status: Heavy Tobacco Smoker    Tobacco comment: Handout provided for 1-800-QUIT-NOW smoking cessation program   Substance Use Topics    Alcohol use: Yes     Alcohol/week: 2.0 standard drinks     Types: 2 Cans of beer per week    Drug use: Yes     Types: Marijuana     Review of Systems   Constitutional: Positive for appetite change. Negative for activity change and fever.   HENT: Negative for sore throat.    Respiratory: Negative for shortness of breath.    Cardiovascular: Positive for chest pain (From vomiting).   Gastrointestinal: Positive for abdominal pain, nausea and vomiting. Negative for blood in stool, constipation and diarrhea.   Genitourinary: Positive for pelvic pain ( left). Negative for dysuria.   Musculoskeletal: Negative for back pain.   Skin: Negative for rash.   Neurological: Positive for weakness.   Hematological: Does not bruise/bleed easily.    Psychiatric/Behavioral: Positive for agitation and sleep disturbance. The patient is nervous/anxious.    All other systems reviewed and are negative.      Physical Exam     Initial Vitals [11/01/19 1710]   BP Pulse Resp Temp SpO2   (!) 181/98 67 18 97.5 °F (36.4 °C) 100 %      MAP       --         Physical Exam    Nursing note and vitals reviewed.  Constitutional: She appears well-developed and well-nourished. She is not diaphoretic. She appears distressed.   HENT:   Head: Normocephalic and atraumatic.   Dry oropharynx   Eyes: Conjunctivae are normal. Pupils are equal, round, and reactive to light.   Neck: Neck supple.   Cardiovascular: Normal rate, regular rhythm, normal heart sounds and intact distal pulses.   Pulmonary/Chest: Breath sounds normal. No respiratory distress. She has no wheezes. She has no rhonchi. She has no rales.   Abdominal: Soft. Bowel sounds are normal. She exhibits no distension. There is tenderness (Epigastric, mild). There is no guarding.   Musculoskeletal: Normal range of motion. She exhibits no edema or tenderness.   Neurological: She is alert and oriented to person, place, and time. She has normal strength. GCS score is 15. GCS eye subscore is 4. GCS verbal subscore is 5. GCS motor subscore is 6.   Skin: Skin is warm and dry. Capillary refill takes less than 2 seconds. No rash noted.   Psychiatric: Thought content normal.   Anxious         ED Course   Procedures  Labs Reviewed   COMPREHENSIVE METABOLIC PANEL - Abnormal; Notable for the following components:       Result Value    Potassium 2.9 (*)     CO2 30 (*)     All other components within normal limits          Imaging Results          X-Ray Chest 1 View (Final result)  Result time 11/01/19 18:11:40   Procedure changed from X-Ray Chest AP Portable     Final result by Faraz Andino MD (11/01/19 18:11:40)                 Impression:      No acute abnormality.      Electronically signed by: Faraz  Thu  Date:    11/01/2019  Time:    18:11             Narrative:    EXAMINATION:  XR CHEST 1 VIEW    CLINICAL HISTORY:  vomiting; Vomiting, unspecified    TECHNIQUE:  Single frontal view of the chest was performed.    COMPARISON:  None    FINDINGS:  The lungs are clear, with normal appearance of pulmonary vasculature and no pleural effusion or pneumothorax.    The cardiac silhouette is normal in size. The hilar and mediastinal contours are unremarkable.    Bones are intact.                               US Pelvis Complete Non OB (Final result)  Result time 11/01/19 18:13:19   Procedure changed from US Pelvis Comp with Transvag NON-OB (xpd     Final result by Faraz Andino MD (11/01/19 18:13:19)                 Impression:      Ultrasound of pelvis demonstrates 1.6 cm left ovarian cyst.      Electronically signed by: Faraz Andino  Date:    11/01/2019  Time:    18:13             Narrative:    EXAMINATION:  US PELVIS COMPLETE NON OB    CLINICAL HISTORY:  ovarian cyst;    COMPARISON:  None    FINDINGS:  The patient declined to have transvaginal probe exam.    Transabdominal images of the pelvis show a normal uterus at 6.83 x 370 x 4.28cm.    The endometrial stripe is 2 pointmm.    The right ovary is not identified.    The left ovary is 2.34 x 1.65 x 2 6cm.  There is a 1.6 cm left ovarian cyst with sharp, well-defined margins.  There is normal vascular flow to the left ovary.    No cul-de-sac fluid.                                 Medical Decision Making:   Initial Assessment:   This is an emergent evaluation of a 34 y.o.female patient with presentation of intractable vomiting, associated epigastric and chest pain.     Initial differentials include but are not limited to:  Gastritis, ulcer, sickle cool vomiting syndrome, cannabinoid hyperemesis syndrome, GERD, perforated ulcer, Boerhaave syndrome, ovarian cyst, less likely consider ovarian torsion.     Plan:  CMP, chest x-ray, ultrasound pelvis, symptomatic  control with Toradol, Phenergan and Reglan  Clinical Tests:   Lab Tests: Ordered  Radiological Study: Ordered              Attending Attestation:             Attending ED Notes:   Pt was re-evaluated and turned over to Dr. Cronin at shift change 6pm in stable condition. Pt is pending labs, treatment and reassessment for intractable vomiting.              Clinical Impression:       ICD-10-CM ICD-9-CM   1. Cyclical vomiting syndrome not associated with migraine R11.15 536.2   2. Vomiting R11.10 787.03                                Manish Perez MD  11/10/19 9265

## 2019-11-02 NOTE — ED NOTES
Pt  lying in bed. Pillow provided for pt comfort. Respirations even and unlabored. Skin warm and dry. NAD noted. Vital signs remain stable. Call light remains in reach. Pt instructed to notify staff should needs arise. Understanding verbalized. IV fluids/potassium infusing without difficulty. Will continue to monitor.

## 2019-11-05 ENCOUNTER — OFFICE VISIT (OUTPATIENT)
Dept: GASTROENTEROLOGY | Facility: CLINIC | Age: 34
End: 2019-11-05
Payer: COMMERCIAL

## 2019-11-05 VITALS — HEART RATE: 114 BPM | DIASTOLIC BLOOD PRESSURE: 149 MMHG | SYSTOLIC BLOOD PRESSURE: 167 MMHG

## 2019-11-05 DIAGNOSIS — R11.2 INTRACTABLE VOMITING WITH NAUSEA: ICD-10-CM

## 2019-11-05 DIAGNOSIS — R19.7 DIARRHEA, UNSPECIFIED TYPE: ICD-10-CM

## 2019-11-05 DIAGNOSIS — R13.10 DYSPHAGIA, UNSPECIFIED TYPE: Primary | ICD-10-CM

## 2019-11-05 PROCEDURE — 99999 PR PBB SHADOW E&M-EST. PATIENT-LVL III: ICD-10-PCS | Mod: PBBFAC,,, | Performed by: INTERNAL MEDICINE

## 2019-11-05 PROCEDURE — 99999 PR PBB SHADOW E&M-EST. PATIENT-LVL III: CPT | Mod: PBBFAC,,, | Performed by: INTERNAL MEDICINE

## 2019-11-05 PROCEDURE — 99204 PR OFFICE/OUTPT VISIT, NEW, LEVL IV, 45-59 MIN: ICD-10-PCS | Mod: S$GLB,,, | Performed by: INTERNAL MEDICINE

## 2019-11-05 PROCEDURE — 99204 OFFICE O/P NEW MOD 45 MIN: CPT | Mod: S$GLB,,, | Performed by: INTERNAL MEDICINE

## 2019-11-05 RX ORDER — ONDANSETRON 4 MG/1
4 TABLET, FILM COATED ORAL EVERY 8 HOURS PRN
Qty: 40 TABLET | Refills: 3 | Status: SHIPPED | OUTPATIENT
Start: 2019-11-05 | End: 2019-12-05

## 2019-11-05 RX ORDER — HYDROCHLOROTHIAZIDE 12.5 MG/1
12.5 TABLET ORAL DAILY
Qty: 30 TABLET | Refills: 0 | Status: SHIPPED | OUTPATIENT
Start: 2019-11-05 | End: 2020-11-04

## 2019-11-05 RX ORDER — MONTELUKAST SODIUM 4 MG/1
1 TABLET, CHEWABLE ORAL 2 TIMES DAILY
Qty: 180 TABLET | Refills: 3 | Status: SHIPPED | OUTPATIENT
Start: 2019-11-05 | End: 2020-11-04

## 2019-11-05 RX ORDER — PANTOPRAZOLE SODIUM 40 MG/1
40 TABLET, DELAYED RELEASE ORAL DAILY
Qty: 30 TABLET | Refills: 3 | Status: SHIPPED | OUTPATIENT
Start: 2019-11-05 | End: 2019-12-05

## 2019-11-05 NOTE — PROGRESS NOTES
Subjective:       Patient ID: Janell Keating is a 34 y.o. female.    Chief Complaint: Emesis and Gastroesophageal Reflux    Patient here to establish care with aforementioned complaints.  Patient reports recent history of significant nausea and vomiting that began approximately a week ago.  She did experience some scant blood in her emesis however it seemed to be self-limiting. She reportedly has presented to the emergency department few times admitted overnight once.  It was initially determined that her complaints were due to significant alcohol use however they have persisted even with abstinence.  In addition patient reports frequent marijuana use however never more than a few times per week.  Denies sick contacts.  Reports experiencing similar symptoms around the time of her 2016 cholecystectomy.  Fortunately over the past 2 days her nausea has improved.  However patient does report persistent dysphagia and heartburn.    Denies family history of GI malignancy.  She does report her maternal grandfather did experience colon cancer diagnosed in his 60s to 70s.     Finally patient reports history of loose stool since cholecystectomy.  Denies blood in stool.    Radiology review:  CT abdomen pelvis 10/2019  Impression      1. The appendix and the rest of the gastrointestinal system are normal in appearance.  2. The uterus and ovaries were not optimally visualized. There are findings characteristic of a 16 mm follicle in the left ovary.  If additional imaging evaluation is clinically indicated, I recommend consideration of an ultrasound examination of the pelvis.  3. There are 4 lumbar-type vertebral bodies. There is a transitional vertebral body between L4 and the sacrum.  4. There are mild dependent atelectatic changes in both lungs.  5. The gallbladder is absent. There are surgical clips in the gallbladder fossa.  All CT scans at this facility use dose modulation, iterative reconstruction, and/or weight base dosing  when appropriate to reduce radiation dose when appropriate to reduce radiation dose to as low as reasonably achievable.          Patient is accompanied by friend who corroborate above history.    Past Medical History:   Diagnosis Date    Depression     Hypothyroidism        Past Surgical History:   Procedure Laterality Date    GALLBLADDER SURGERY         Social History  Social History     Tobacco Use    Smoking status: Heavy Tobacco Smoker    Tobacco comment: Handout provided for 1-800-QUIT-NOW smoking cessation program   Substance Use Topics    Alcohol use: Yes     Alcohol/week: 2.0 standard drinks     Types: 2 Cans of beer per week    Drug use: Yes     Types: Marijuana       No family history on file.    Review of Systems   Constitutional: Negative for chills, fever and unexpected weight change.   HENT: Positive for trouble swallowing. Negative for congestion.    Eyes: Negative for photophobia and visual disturbance.   Respiratory: Negative for cough and shortness of breath.    Cardiovascular: Negative for chest pain and leg swelling.   Gastrointestinal: Positive for abdominal pain, diarrhea, nausea and vomiting. Negative for abdominal distention and blood in stool.   Genitourinary: Positive for hematuria. Negative for dysuria.   Musculoskeletal: Negative for arthralgias and myalgias.   Skin: Negative for color change and rash.   Neurological: Positive for weakness. Negative for dizziness, light-headedness and numbness.   Psychiatric/Behavioral: Negative for agitation and confusion.           Objective:      Physical Exam   Constitutional: She is oriented to person, place, and time. She appears well-developed and well-nourished.   HENT:   Head: Normocephalic and atraumatic.   Eyes: Pupils are equal, round, and reactive to light. EOM are normal. No scleral icterus.   Neck: Normal range of motion. Neck supple.   Cardiovascular: Normal rate, regular rhythm, normal heart sounds and intact distal pulses.    Pulmonary/Chest: Effort normal and breath sounds normal. No respiratory distress.   Abdominal: Soft. Bowel sounds are normal. She exhibits no distension. There is no tenderness.   Musculoskeletal: Normal range of motion. She exhibits no edema.   Neurological: She is alert and oriented to person, place, and time.   No asterixis   Skin: Skin is warm and dry. No rash noted. No erythema.   Psychiatric: She has a normal mood and affect. Her behavior is normal.   Nursing note and vitals reviewed.        Pertinent labs and imaging studies reviewed    Assessment:       1. Dysphagia, unspecified type    2. Intractable vomiting with nausea    3. Diarrhea, unspecified type        Plan:       Given significant nausea and vomiting complicated by hematemesis and now dysphagia will send for EGD  In the interim will start patient on PPI.  Continue take Zofran p.r.n.  Trial of Colestid for suspected bile salt diarrhea    (Portions of this note were dictated using voice recognition software and may contain dictation related errors in spelling/grammar/syntax not found on text review)

## 2019-11-05 NOTE — PATIENT INSTRUCTIONS
EGD Prep Instructions    [facility] [address] Clinic Office [phone]  Endoscopy Lab [phone]    You are scheduled for an EGD with Dr. Daniel on 11/14/2019 at [facility].  You will check in at Admit on the first floor of the hospital. Please contact the office two days before procedure date to reschedule if needed.    You may not have anything to eat after 7pm and nothing to drink after midnight.  You can drink clear liquids between 7pm and midnight.    You MAY take your blood pressure, heart, and seizure medication on the morning of the procedure, with a SIP of water.  Hold ALL other medications until after the procedure.    If you are on blood thinners THAT YOU HAVE BEEN INSTRUCTED TO HOLD BY YOUR DOCTOR FOR THIS PROCEDURE, then do NOT take this the morning of your EGD.  Do NOT stop these medications on your own, they must be approved to be held by your doctor.  Your EGD can NOT be done if you are on these medications.  Examples of blood thinners include: Coumadin, Aggrenox, Plavix, Pradaxa, Reapro, Pletal, Xarelto, Ticagrelor, Brilinta, Eliquis, and high dose aspirin (325 mg).  You do not have to stop baby aspirin 81 mg.

## 2019-11-06 ENCOUNTER — TELEPHONE (OUTPATIENT)
Dept: GASTROENTEROLOGY | Facility: CLINIC | Age: 34
End: 2019-11-06

## 2019-11-07 ENCOUNTER — TELEPHONE (OUTPATIENT)
Dept: ENDOSCOPY | Facility: HOSPITAL | Age: 34
End: 2019-11-07

## 2019-11-14 ENCOUNTER — TELEPHONE (OUTPATIENT)
Dept: GASTROENTEROLOGY | Facility: CLINIC | Age: 34
End: 2019-11-14

## 2019-11-14 NOTE — TELEPHONE ENCOUNTER
Attempted to contact patient to reschedule procedure with Dr. Daniel. Unable to leave patient a message.

## 2020-04-29 NOTE — PROGRESS NOTES
04/29/20 1555   Encounter Summary   Services provided to: Patient   Referral/Consult From: [facility];Family members   Continue Visiting   (4/29  called pt & offered spt/prayer.)   Complexity of Encounter Moderate   Length of Encounter 15 minutes   Spiritual Assessment Completed Yes   Spiritual/Taoist   Type Spiritual support   Assessment Calm; Approachable; Hopeful;Coping   Intervention Active listening;Explored feelings, thoughts, concerns;Explored coping resources;Nurtured hope;Prayer;Discussed meaning/purpose;Discussed relationship with God;Discussed illness/injury and it's impact   Outcome Comfort;Expressed gratitude;Expressed feelings of meenu, peace, and/or awe;Engaged in conversation; Shared life review;Expressed feelings/needs/concerns;Coping;Encouraged; Hopeful;Receptive CC: annual     HISTORY OF PRESENT ILLNESS:   Janell Keating is a 34 y.o. female  who presents for annual exam and to f/u AUB.  Patient's last menstrual period was 2019..  She normally monthly cycles lasting 10 days. But she started bleeding  and hasn't stopped. She is changing 7-8 tampons a day. She is passing quarter sized clots. She is starting to feel fatigued. She absolutely declines pelvic exam today. Hasn't had one since 14.       Past Medical History:   Diagnosis Date    Depression    ALLB  Past Surgical History:   Procedure Laterality Date    GALLBLADDER SURGERY     LADDER SURGERY          Socioeconomic History    Marital status:      Spouse name: Not on file    Number of children: Not on file    Years of education: Not on file    Highest education level: Not on file   Occupational History    Not on file   Social Needs    Financial resource strain: Not on file    Food insecurity:     Worry: Not on file     Inability: Not on file    Transportation needs:     Medical: Not on file     Non-medical: Not on file   Tobacco Use    Smoking status: Heavy Tobacco Smoker   Substance and Sexual Activity    Alcohol use: Yes     Alcohol/week: 1.2 oz     Types: 2 Cans of beer per week    Drug use: Yes     Types: Marijuana    Sexual activity: Yes     Partners: Female   Lifestyle    Physical activity:     Days per week: Not on file     Minutes per session: Not on file    Stress: Not on file   Relationships    Social connections:     Talks on phone: Not on file     Gets together: Not on file     Attends Restorationism service: Not on file     Active member of club or organization: Not on file     Attends meetings of clubs or organizations: Not on file     Relationship status: Not on file   Other Topics Concern    Not on file   Social History Narrative    Not on file       No family history on file.      OB History    Para Term  AB Living   0 0 0 0 0 0   SAB TAB Ectopic Multiple Live  Births   0 0 0 0 0         COMPREHENSIVE GYN HISTORY:  PAP History: Denies abnormal Paps  Infection History: Denies STDs. Denies PID.  Benign History:Denies uterine fibroids. Denies ovarian cysts. Denies endometriosis Denies other conditions.  Cancer History: Denies cervical cancer. Denies uterine cancer or hyperplasia. Denies ovarian cancer. Denies vulvar cancer or pre-cancer. Denies vaginal cancer or pre-cancer. Denies breast cancer. Denies colon cancer.  Cycle: 12/mon/7-10 days    ROS:  GENERAL: Denies weight gain or weight loss. Feeling well overall.   SKIN: Denies rash or lesions.   HEAD: Denies headache.   NODES: Denies enlarged lymph nodes.   CHEST: Denies shortness of breath.   ABDOMEN: No abdominal pain, constipation, diarrhea, nausea, vomiting or rectal bleeding.   URINARY: No frequency, dysuria, hematuria, or burning on urination.  REPRODUCTIVE: See HPI.   BREASTS: The patient denies pain, lumps, or nipple discharge.       LMP 08/19/2019     APPEARANCE: Well nourished, well developed, in no acute distress.  NECK: Neck symmetric without  thyromegaly.  NODES: No inguinal, cervical lymph node enlargement.  CHEST: Lungs clear to auscultation.  HEART: Regular rate and rhythm, no murmurs, rubs or gallops.  ABDOMEN: Soft. No tenderness or masses. No hernias. No hepatosplenomegaly.  Breast: declines   PELVIC: normal external genitalia  Normal vaginal mucosa  Normal appearing cervix  Normal sized uterus   No adnexal masses appreciated    TVUS: The uterus is normal in appearance.  It measures 6.9 cm x 3.3 cm x 4.6 cm in mediolateral dimension.  The endometrial stripe thickness measures 3 mm.  The left ovary is normal in appearance.  It measures 2.4 cm by 1.9 cm x 2.1 cm.    The right ovary measures 5.5 cm x 4.7 cm x 5.8 cm.  The right ovary largely consists of a 50 mm anechoic cyst.  .  The urinary bladder is normal in appearance.  There is no free fluid visualized within the pelvis.      Annual   Pap smear  Right  ovarian cyst    Plan:  1. Annual exam done today with pap smear.   2. Repeat US in 6-8 weeks to re-evaulate cyst    3. No obvious cause of AUB on exam or US. Could have been anovulation 2/2 cyst. Discussed options including observation, treatment with medications or surgery. She would like to observe. She inquired about hysterectomy but discussed with her if not anemic and her cycles continue to be regular then would recommend monitoring and not proceed with surgery if not needed.       F/u in 1 week

## 2022-08-20 ENCOUNTER — HOSPITAL ENCOUNTER (EMERGENCY)
Facility: HOSPITAL | Age: 37
Discharge: HOME | End: 2022-08-20
Attending: EMERGENCY MEDICINE
Payer: COMMERCIAL

## 2022-08-20 VITALS
WEIGHT: 202 LBS | SYSTOLIC BLOOD PRESSURE: 170 MMHG | HEIGHT: 64 IN | TEMPERATURE: 98 F | RESPIRATION RATE: 18 BRPM | DIASTOLIC BLOOD PRESSURE: 113 MMHG | OXYGEN SATURATION: 97 % | HEART RATE: 96 BPM | BODY MASS INDEX: 34.49 KG/M2

## 2022-08-20 DIAGNOSIS — K08.89 DENTALGIA: Primary | ICD-10-CM

## 2022-08-20 PROCEDURE — 99283 PR EMERGENCY DEPT VISIT,LEVEL III: ICD-10-PCS | Mod: ,,, | Performed by: EMERGENCY MEDICINE

## 2022-08-20 PROCEDURE — 25000003 PHARM REV CODE 250: Performed by: EMERGENCY MEDICINE

## 2022-08-20 PROCEDURE — 99283 EMERGENCY DEPT VISIT LOW MDM: CPT | Mod: ,,, | Performed by: EMERGENCY MEDICINE

## 2022-08-20 PROCEDURE — 99284 EMERGENCY DEPT VISIT MOD MDM: CPT

## 2022-08-20 RX ORDER — PENICILLIN V POTASSIUM 500 MG/1
500 TABLET, FILM COATED ORAL EVERY 6 HOURS
Qty: 40 TABLET | Refills: 0 | Status: SHIPPED | OUTPATIENT
Start: 2022-08-20 | End: 2022-08-20 | Stop reason: SDUPTHER

## 2022-08-20 RX ORDER — IBUPROFEN 800 MG/1
800 TABLET ORAL EVERY 6 HOURS PRN
Qty: 20 TABLET | Refills: 0 | Status: SHIPPED | OUTPATIENT
Start: 2022-08-20

## 2022-08-20 RX ORDER — IBUPROFEN 400 MG/1
800 TABLET ORAL
Status: COMPLETED | OUTPATIENT
Start: 2022-08-20 | End: 2022-08-20

## 2022-08-20 RX ORDER — PENICILLIN V POTASSIUM 500 MG/1
500 TABLET, FILM COATED ORAL EVERY 6 HOURS
Qty: 40 TABLET | Refills: 0 | Status: SHIPPED | OUTPATIENT
Start: 2022-08-20 | End: 2022-08-30

## 2022-08-20 RX ORDER — IBUPROFEN 800 MG/1
800 TABLET ORAL EVERY 6 HOURS PRN
Qty: 20 TABLET | Refills: 0 | Status: SHIPPED | OUTPATIENT
Start: 2022-08-20 | End: 2022-08-20 | Stop reason: SDUPTHER

## 2022-08-20 RX ADMIN — IBUPROFEN 800 MG: 400 TABLET ORAL at 03:08

## 2022-08-20 NOTE — ED PROVIDER NOTES
Encounter Date: 8/20/2022       History     Chief Complaint   Patient presents with    Dental Pain     Onset 8- -left lower dental/mouth     A 37-year-old female patient came to the emergency department complaining of dental pain.  The patient states that the pain started 2 days ago in her left lower side.  There is no difficulty of swallowing.  There is no difficulty information.  There is no fever or chills.    The history is provided by the patient. No  was used.     Review of patient's allergies indicates:   Allergen Reactions    Sulfa (sulfonamide antibiotics) Hives     Past Medical History:   Diagnosis Date    Depression     Hypothyroidism      Past Surgical History:   Procedure Laterality Date    GALLBLADDER SURGERY       History reviewed. No pertinent family history.  Social History     Tobacco Use    Smoking status: Heavy Tobacco Smoker    Tobacco comment: Handout provided for 1-800-QUIT-NOW smoking cessation program   Substance Use Topics    Alcohol use: Yes     Alcohol/week: 2.0 standard drinks     Types: 2 Cans of beer per week    Drug use: Yes     Types: Marijuana     Review of Systems   Constitutional: Negative for fever.   HENT: Positive for dental problem. Negative for sore throat.    Respiratory: Negative for shortness of breath.    Cardiovascular: Negative for chest pain.   Gastrointestinal: Negative for nausea.   Genitourinary: Negative for dysuria.   Musculoskeletal: Negative for back pain.   Skin: Negative for rash.   Neurological: Negative for weakness.   Hematological: Does not bruise/bleed easily.   All other systems reviewed and are negative.      Physical Exam     Initial Vitals   BP Pulse Resp Temp SpO2   08/20/22 0242 08/20/22 0242 08/20/22 0242 08/20/22 0245 08/20/22 0242   (!) 170/113 96 18 97.9 °F (36.6 °C) 97 %      MAP       --                Physical Exam    Constitutional: She appears well-developed and well-nourished. She appears distressed.    HENT:   Head: Normocephalic and atraumatic.   Mouth/Throat:       Eyes: EOM are normal. Pupils are equal, round, and reactive to light.   Neck: Neck supple.   Normal range of motion.  Cardiovascular: Normal rate.   Pulmonary/Chest: No respiratory distress.   Abdominal: Abdomen is soft.   Musculoskeletal:         General: Normal range of motion.      Cervical back: Normal range of motion and neck supple.     Neurological: She is alert and oriented to person, place, and time.   Skin: Skin is warm and dry.         Medical Screening Exam   See Full Note    ED Course   Procedures  Labs Reviewed - No data to display       Imaging Results    None          Medications   ibuprofen tablet 800 mg (800 mg Oral Given 8/20/22 0303)                       Clinical Impression:   Final diagnoses:  [K08.89] Dentalgia (Primary)          ED Disposition Condition    Discharge Stable        ED Prescriptions     Medication Sig Dispense Start Date End Date Auth. Provider    penicillin v potassium (VEETID) 500 MG tablet  (Status: Discontinued) Take 1 tablet (500 mg total) by mouth every 6 (six) hours. for 10 days 40 tablet 8/20/2022 8/20/2022 Wilton Nielson MD    ibuprofen (ADVIL,MOTRIN) 800 MG tablet  (Status: Discontinued) Take 1 tablet (800 mg total) by mouth every 6 (six) hours as needed for Pain. 20 tablet 8/20/2022 8/20/2022 Wilton Nielson MD    ibuprofen (ADVIL,MOTRIN) 800 MG tablet Take 1 tablet (800 mg total) by mouth every 6 (six) hours as needed for Pain. 20 tablet 8/20/2022  Wilton Nielson MD    penicillin v potassium (VEETID) 500 MG tablet Take 1 tablet (500 mg total) by mouth every 6 (six) hours. for 10 days 40 tablet 8/20/2022 8/30/2022 Wilton Nielson MD        Follow-up Information    None          Wilton Nielson MD  08/21/22 0152

## 2023-02-06 NOTE — TELEPHONE ENCOUNTER
Patient seen and evaluated by ED MALENA Lara and cleared for registration and discharge.    Spoke with patient about arrival time @ 1230.     NPO status reviewed: Patient must have nothing to eat after midnight.  Pt may have CLEAR liquids ONLY until completely NPO  @ 0830*    Medications: Do not take Insulin or oral diabetic medications the day of the procedure.  Take as prescribed: heart, seizure and blood pressure medication in the morning with a sip of water (less than an ounce).  Take any breathing medications and bring inhalers to hospital with you Leave all valuables and jewelry at home.     Wear comfortable clothes to procedure to change into hospital gown You cannot drive for 24 hours after your procedure because you will receive sedation for your procedure to make you comfortable.  A ride must be provided at discharge.